# Patient Record
Sex: MALE | Race: WHITE | Employment: OTHER | URBAN - METROPOLITAN AREA
[De-identification: names, ages, dates, MRNs, and addresses within clinical notes are randomized per-mention and may not be internally consistent; named-entity substitution may affect disease eponyms.]

---

## 2017-01-04 ENCOUNTER — APPOINTMENT (OUTPATIENT)
Dept: CT IMAGING | Age: 62
End: 2017-01-04
Payer: COMMERCIAL

## 2017-01-04 ENCOUNTER — HOSPITAL ENCOUNTER (EMERGENCY)
Age: 62
Discharge: HOME | End: 2017-01-04
Attending: EMERGENCY MEDICINE
Payer: COMMERCIAL

## 2017-01-04 VITALS
HEART RATE: 78 BPM | OXYGEN SATURATION: 98 % | SYSTOLIC BLOOD PRESSURE: 144 MMHG | DIASTOLIC BLOOD PRESSURE: 81 MMHG | HEIGHT: 78 IN | RESPIRATION RATE: 18 BRPM | WEIGHT: 315 LBS | BODY MASS INDEX: 36.45 KG/M2

## 2017-01-04 DIAGNOSIS — N28.9 RENAL INSUFFICIENCY: Primary | ICD-10-CM

## 2017-01-04 DIAGNOSIS — J01.10 ACUTE FRONTAL SINUSITIS, RECURRENCE NOT SPECIFIED: ICD-10-CM

## 2017-01-04 LAB
ALBUMIN SERPL-MCNC: 3.9 G/DL (ref 3.9–4.9)
ALP BLD-CCNC: 52 U/L (ref 35–104)
ALT SERPL-CCNC: 69 U/L (ref 0–41)
ANION GAP SERPL CALCULATED.3IONS-SCNC: 16 MEQ/L (ref 7–13)
AST SERPL-CCNC: 56 U/L (ref 0–40)
BACTERIA: NORMAL /HPF
BASOPHILS ABSOLUTE: 0 K/UL (ref 0–0.2)
BASOPHILS RELATIVE PERCENT: 0.1 %
BILIRUB SERPL-MCNC: 0.7 MG/DL (ref 0–1.2)
BILIRUBIN URINE: NEGATIVE
BLOOD, URINE: ABNORMAL
BUN BLDV-MCNC: 29 MG/DL (ref 8–23)
CALCIUM SERPL-MCNC: 9.3 MG/DL (ref 8.6–10.2)
CHLORIDE BLD-SCNC: 93 MEQ/L (ref 98–107)
CLARITY: CLEAR
CO2: 24 MEQ/L (ref 22–29)
COLOR: YELLOW
CREAT SERPL-MCNC: 2.37 MG/DL (ref 0.7–1.2)
EOSINOPHILS ABSOLUTE: 0.4 K/UL (ref 0–0.7)
EOSINOPHILS RELATIVE PERCENT: 8.5 %
EPITHELIAL CELLS, UA: NORMAL /HPF
GFR AFRICAN AMERICAN: 33.9
GFR NON-AFRICAN AMERICAN: 28
GLOBULIN: 3.7 G/DL (ref 2.3–3.5)
GLUCOSE BLD-MCNC: 218 MG/DL (ref 74–109)
GLUCOSE URINE: 100 MG/DL
HCT VFR BLD CALC: 41.5 % (ref 42–52)
HEMOGLOBIN: 13.8 G/DL (ref 14–18)
INR BLD: 1.1
KETONES, URINE: NEGATIVE MG/DL
LACTIC ACID: 2.6 MMOL/L (ref 0.5–2.2)
LEUKOCYTE ESTERASE, URINE: NEGATIVE
LYMPHOCYTES ABSOLUTE: 0.5 K/UL (ref 1–4.8)
LYMPHOCYTES RELATIVE PERCENT: 10.2 %
MCH RBC QN AUTO: 30.5 PG (ref 27–31.3)
MCHC RBC AUTO-ENTMCNC: 33.2 % (ref 33–37)
MCV RBC AUTO: 91.9 FL (ref 80–100)
MONOCYTES ABSOLUTE: 0.3 K/UL (ref 0.2–0.8)
MONOCYTES RELATIVE PERCENT: 5.9 %
NEUTROPHILS ABSOLUTE: 3.5 K/UL (ref 1.4–6.5)
NEUTROPHILS RELATIVE PERCENT: 75.3 %
NITRITE, URINE: NEGATIVE
PDW BLD-RTO: 14.2 % (ref 11.5–14.5)
PH UA: 7 (ref 5–9)
PLATELET # BLD: 111 K/UL (ref 130–400)
POTASSIUM SERPL-SCNC: 4.4 MEQ/L (ref 3.5–5.1)
PROTEIN UA: 30 MG/DL
PROTHROMBIN TIME: 11.1 SEC (ref 9.6–12.3)
RAPID INFLUENZA  B AGN: NEGATIVE
RAPID INFLUENZA A AGN: NEGATIVE
RBC # BLD: 4.52 M/UL (ref 4.7–6.1)
RBC UA: NORMAL /HPF (ref 0–2)
SODIUM BLD-SCNC: 133 MEQ/L (ref 132–144)
SPECIFIC GRAVITY UA: 1.01 (ref 1–1.03)
TOTAL PROTEIN: 7.6 G/DL (ref 6.4–8.1)
URIC ACID, SERUM: 7.1 MG/DL (ref 3.4–7)
URINE REFLEX TO CULTURE: YES
UROBILINOGEN, URINE: 1 E.U./DL
WBC # BLD: 4.7 K/UL (ref 4.8–10.8)
WBC UA: NORMAL /HPF (ref 0–5)

## 2017-01-04 PROCEDURE — 83605 ASSAY OF LACTIC ACID: CPT

## 2017-01-04 PROCEDURE — 85025 COMPLETE CBC W/AUTO DIFF WBC: CPT

## 2017-01-04 PROCEDURE — 6370000000 HC RX 637 (ALT 250 FOR IP): Performed by: EMERGENCY MEDICINE

## 2017-01-04 PROCEDURE — 99284 EMERGENCY DEPT VISIT MOD MDM: CPT

## 2017-01-04 PROCEDURE — 2580000003 HC RX 258: Performed by: EMERGENCY MEDICINE

## 2017-01-04 PROCEDURE — 85610 PROTHROMBIN TIME: CPT

## 2017-01-04 PROCEDURE — 80053 COMPREHEN METABOLIC PANEL: CPT

## 2017-01-04 PROCEDURE — 81001 URINALYSIS AUTO W/SCOPE: CPT

## 2017-01-04 PROCEDURE — 74176 CT ABD & PELVIS W/O CONTRAST: CPT

## 2017-01-04 PROCEDURE — 87086 URINE CULTURE/COLONY COUNT: CPT

## 2017-01-04 PROCEDURE — 84550 ASSAY OF BLOOD/URIC ACID: CPT

## 2017-01-04 PROCEDURE — 86403 PARTICLE AGGLUT ANTBDY SCRN: CPT

## 2017-01-04 RX ORDER — AMOXICILLIN AND CLAVULANATE POTASSIUM 875; 125 MG/1; MG/1
1 TABLET, FILM COATED ORAL 2 TIMES DAILY
Qty: 20 TABLET | Refills: 0 | Status: SHIPPED | OUTPATIENT
Start: 2017-01-04 | End: 2017-01-14

## 2017-01-04 RX ORDER — AMOXICILLIN AND CLAVULANATE POTASSIUM 875; 125 MG/1; MG/1
1 TABLET, FILM COATED ORAL ONCE
Status: COMPLETED | OUTPATIENT
Start: 2017-01-04 | End: 2017-01-04

## 2017-01-04 RX ORDER — 0.9 % SODIUM CHLORIDE 0.9 %
1000 INTRAVENOUS SOLUTION INTRAVENOUS ONCE
Status: COMPLETED | OUTPATIENT
Start: 2017-01-04 | End: 2017-01-04

## 2017-01-04 RX ADMIN — AMOXICILLIN AND CLAVULANATE POTASSIUM 1 TABLET: 875; 125 TABLET, FILM COATED ORAL at 21:23

## 2017-01-04 RX ADMIN — SODIUM CHLORIDE 1000 ML: 9 INJECTION, SOLUTION INTRAVENOUS at 19:13

## 2017-01-04 ASSESSMENT — ENCOUNTER SYMPTOMS
FACIAL SWELLING: 0
BACK PAIN: 0
EYE DISCHARGE: 0
CHEST TIGHTNESS: 0
COUGH: 0
BLOOD IN STOOL: 0
STRIDOR: 0
VOMITING: 0
ABDOMINAL PAIN: 0
SHORTNESS OF BREATH: 0
CHOKING: 0
SINUS PRESSURE: 1
EYE PAIN: 0
CONSTIPATION: 0
TROUBLE SWALLOWING: 0
DIARRHEA: 0
SORE THROAT: 0
RHINORRHEA: 1
WHEEZING: 0
VOICE CHANGE: 0
EYE REDNESS: 0

## 2017-01-07 LAB — URINE CULTURE, ROUTINE: NORMAL

## 2017-01-11 ENCOUNTER — HOSPITAL ENCOUNTER (OUTPATIENT)
Dept: LAB | Age: 62
Discharge: HOME | End: 2017-01-11
Payer: COMMERCIAL

## 2017-01-11 LAB
ANION GAP SERPL CALCULATED.3IONS-SCNC: 13 MEQ/L (ref 7–13)
BILIRUBIN URINE: NEGATIVE
BLOOD, URINE: NEGATIVE
BUN BLDV-MCNC: 24 MG/DL (ref 8–23)
CALCIUM SERPL-MCNC: 9 MG/DL (ref 8.6–10.2)
CHLORIDE BLD-SCNC: 99 MEQ/L (ref 98–107)
CHOLESTEROL, TOTAL: 196 MG/DL (ref 0–199)
CLARITY: CLEAR
CO2: 21 MEQ/L (ref 22–29)
COLOR: YELLOW
CREAT SERPL-MCNC: 1.6 MG/DL (ref 0.7–1.2)
CREATININE URINE: 204 MG/DL
FOLATE: 16.9 NG/ML (ref 7.3–26.1)
GFR AFRICAN AMERICAN: 53.3
GFR NON-AFRICAN AMERICAN: 44
GLUCOSE BLD-MCNC: 166 MG/DL (ref 74–109)
GLUCOSE URINE: NEGATIVE MG/DL
HBA1C MFR BLD: 7.1 % (ref 4.8–5.9)
HCT VFR BLD CALC: 44.2 % (ref 42–52)
HDLC SERPL-MCNC: 29 MG/DL (ref 40–59)
HEMOGLOBIN: 14.6 G/DL (ref 14–18)
IRON SATURATION: 43 % (ref 11–46)
IRON: 158 UG/DL (ref 59–158)
KETONES, URINE: NEGATIVE MG/DL
LDL CHOLESTEROL CALCULATED: 143 MG/DL (ref 0–129)
LEUKOCYTE ESTERASE, URINE: NEGATIVE
MCH RBC QN AUTO: 30.8 PG (ref 27–31.3)
MCHC RBC AUTO-ENTMCNC: 33 % (ref 33–37)
MCV RBC AUTO: 93.6 FL (ref 80–100)
MICROALBUMIN UR-MCNC: 2.4 MG/DL
MICROALBUMIN/CREAT UR-RTO: 11.8 MG/G (ref 0–30)
NITRITE, URINE: NEGATIVE
PDW BLD-RTO: 13.9 % (ref 11.5–14.5)
PH UA: 5.5 (ref 5–9)
PLATELET # BLD: 148 K/UL (ref 130–400)
POTASSIUM SERPL-SCNC: 4.2 MEQ/L (ref 3.5–5.1)
PROTEIN UA: NEGATIVE MG/DL
RBC # BLD: 4.72 M/UL (ref 4.7–6.1)
SODIUM BLD-SCNC: 133 MEQ/L (ref 132–144)
SPECIFIC GRAVITY UA: 1.02 (ref 1–1.03)
TOTAL IRON BINDING CAPACITY: 364 UG/DL (ref 178–450)
TRIGL SERPL-MCNC: 122 MG/DL (ref 0–200)
UROBILINOGEN, URINE: 0.2 E.U./DL
VITAMIN B-12: 904 PG/ML (ref 211–946)
WBC # BLD: 5.6 K/UL (ref 4.8–10.8)

## 2017-01-11 PROCEDURE — 80048 BASIC METABOLIC PNL TOTAL CA: CPT

## 2017-01-11 PROCEDURE — 83550 IRON BINDING TEST: CPT

## 2017-01-11 PROCEDURE — 82607 VITAMIN B-12: CPT

## 2017-01-11 PROCEDURE — 82746 ASSAY OF FOLIC ACID SERUM: CPT

## 2017-01-11 PROCEDURE — 83540 ASSAY OF IRON: CPT

## 2017-01-11 PROCEDURE — 82043 UR ALBUMIN QUANTITATIVE: CPT

## 2017-01-11 PROCEDURE — 85027 COMPLETE CBC AUTOMATED: CPT

## 2017-01-11 PROCEDURE — 36415 COLL VENOUS BLD VENIPUNCTURE: CPT

## 2017-01-11 PROCEDURE — 81003 URINALYSIS AUTO W/O SCOPE: CPT

## 2017-01-11 PROCEDURE — 82570 ASSAY OF URINE CREATININE: CPT

## 2017-01-11 PROCEDURE — 80061 LIPID PANEL: CPT

## 2017-01-11 PROCEDURE — 83036 HEMOGLOBIN GLYCOSYLATED A1C: CPT

## 2022-01-26 ENCOUNTER — HOSPITAL ENCOUNTER (OUTPATIENT)
Dept: LAB | Age: 67
Discharge: HOME | End: 2022-01-26
Payer: MEDICARE

## 2022-01-26 LAB
ALBUMIN SERPL-MCNC: 3.8 G/DL (ref 3.5–4.6)
ALP BLD-CCNC: 82 U/L (ref 35–104)
ALT SERPL-CCNC: 89 U/L (ref 0–41)
ANION GAP SERPL CALCULATED.3IONS-SCNC: 14 MEQ/L (ref 9–15)
AST SERPL-CCNC: 63 U/L (ref 0–40)
BILIRUB SERPL-MCNC: 0.7 MG/DL (ref 0.2–0.7)
BUN BLDV-MCNC: 13 MG/DL (ref 8–23)
CALCIUM SERPL-MCNC: 9.4 MG/DL (ref 8.5–9.9)
CHLORIDE BLD-SCNC: 99 MEQ/L (ref 95–107)
CHOLESTEROL, TOTAL: 157 MG/DL (ref 0–199)
CO2: 26 MEQ/L (ref 20–31)
CREAT SERPL-MCNC: 1.13 MG/DL (ref 0.7–1.2)
GFR AFRICAN AMERICAN: >60
GFR NON-AFRICAN AMERICAN: >60
GLOBULIN: 3.3 G/DL (ref 2.3–3.5)
GLUCOSE BLD-MCNC: 262 MG/DL (ref 70–99)
HDLC SERPL-MCNC: 31 MG/DL (ref 40–59)
LDL CHOLESTEROL CALCULATED: 103 MG/DL (ref 0–129)
POTASSIUM SERPL-SCNC: 3.9 MEQ/L (ref 3.4–4.9)
SODIUM BLD-SCNC: 139 MEQ/L (ref 135–144)
TOTAL PROTEIN: 7.1 G/DL (ref 6.3–8)
TRIGL SERPL-MCNC: 117 MG/DL (ref 0–150)
URIC ACID, SERUM: 4.3 MG/DL (ref 3.4–7)

## 2022-01-26 PROCEDURE — 36415 COLL VENOUS BLD VENIPUNCTURE: CPT

## 2022-01-26 PROCEDURE — 80053 COMPREHEN METABOLIC PANEL: CPT

## 2022-01-26 PROCEDURE — 80061 LIPID PANEL: CPT

## 2022-01-26 PROCEDURE — 84550 ASSAY OF BLOOD/URIC ACID: CPT

## 2022-12-06 ENCOUNTER — HOSPITAL ENCOUNTER (OUTPATIENT)
Dept: GENERAL RADIOLOGY | Age: 67
Discharge: HOME | End: 2022-12-08
Payer: MEDICARE

## 2022-12-06 ENCOUNTER — HOSPITAL ENCOUNTER (OUTPATIENT)
Age: 67
Discharge: HOME | End: 2022-12-08
Payer: MEDICARE

## 2022-12-06 DIAGNOSIS — M54.17 LEFT LUMBOSACRAL RADICULOPATHY: ICD-10-CM

## 2022-12-06 PROCEDURE — 72110 X-RAY EXAM L-2 SPINE 4/>VWS: CPT

## 2023-01-24 ENCOUNTER — HOSPITAL ENCOUNTER (OUTPATIENT)
Dept: PHYSICAL THERAPY | Age: 68
Setting detail: THERAPIES SERIES
Discharge: HOME | End: 2023-01-24
Payer: MEDICARE

## 2023-01-24 PROCEDURE — 97162 PT EVAL MOD COMPLEX 30 MIN: CPT

## 2023-01-24 PROCEDURE — 97110 THERAPEUTIC EXERCISES: CPT

## 2023-01-24 ASSESSMENT — PAIN DESCRIPTION - ONSET: ONSET: ON-GOING

## 2023-01-24 ASSESSMENT — PAIN DESCRIPTION - DESCRIPTORS: DESCRIPTORS: BURNING;ACHING;TINGLING

## 2023-01-24 ASSESSMENT — PAIN DESCRIPTION - FREQUENCY: FREQUENCY: INTERMITTENT

## 2023-01-24 ASSESSMENT — PAIN DESCRIPTION - ORIENTATION: ORIENTATION: RIGHT;LEFT;POSTERIOR

## 2023-01-24 ASSESSMENT — PAIN SCALES - GENERAL: PAINLEVEL_OUTOF10: 0

## 2023-01-24 ASSESSMENT — PAIN - FUNCTIONAL ASSESSMENT: PAIN_FUNCTIONAL_ASSESSMENT: PREVENTS OR INTERFERES SOME ACTIVE ACTIVITIES AND ADLS

## 2023-01-24 ASSESSMENT — PAIN DESCRIPTION - PAIN TYPE: TYPE: ACUTE PAIN;CHRONIC PAIN

## 2023-01-24 NOTE — PROGRESS NOTES
[address]  PHYSICAL THERAPY PLAN OF CARE   [address] [place], [address] [facility]         Ph: [phone]  Fax: [phone]    [x] Certification  [] Recertification [x]  Plan of Care  [] Progress Note [] Discharge      Referring Provider: Inés Neves MD     From:  Sriram Hobbs, PT, DPT  Patient: Cara Gillette (40 y.o. male) : 1955 Date: 2023  Medical Diagnosis: Lumbosacral radiculitis [M54.17] Lumbosacral radiculitis Diagnosis: Lumbosacral radiculitis   Treatment Diagnosis: increased pain, decreased pain free lumbar AROM, decreased bilateral LE & core & lumbar strength, decreased ability to perform functional mobility tasks/ADLs without pain, decreased functional endurance, & impaired postural awareness    Plan of Care/Certification Expiration Date: 23   Progress Report Period from:  2023  to 2023    Visits to Date: 1 No Show: 0 Cancelled Appts: 0    OBJECTIVE:   Short Term Goals - Time Frame for Short Term Goals: 2-4 weeks    Goals Current/Discharge status  Status   Short Term Goal 1: The patient will have a decrease in Oswestry score >/=6 points in order to increase functional activity tolerance  STG 1 Current Status[de-identified] 23: Oswestry = 11/50   New   Short Term Goal 2: The patient will demonstrate improved postural awareness requiring <25% verbal cueing during functional mobility tasks & exercises  STG 2 Current Status[de-identified] 23: decreased postural awareness; forward head & rounded shoulders as well as slightly forward flexed trunk   New     Long Term Goals - Time Frame for Long Term Goals : 4-6 weeks  Goals Current/ Discharge status Status   Long Term Goal 1: The patient will demonstrate competency with HEP to progress towards self management of symptoms upon D/C LTG 1 Current Status[de-identified] 2423: on-going, initiated this date   New   Long Term Goal 2: The patient will demonstrate improved B LE & core & lumbar extension strength >/=4+/5 & Fair/Fair+ in order to perform functional mobility tasks with increased ease. LTG 2 Current Status[de-identified] 1/24/23: RLE & LLE hip flexion = 4+/5; hip extension = 4/5; hip abduction = 4/5; hip adduction = 4/5; knee flexion = 4+/5; knee extension = 5/5; SLR = 4+/5; core = Fair-; lumbar extension = Poor+/Fair-   New   Long Term Goal 3: The patient will demonstrate improved pain free lumbar AROM >/= 10-15* in order to enable patient to perform functional mobility tasks & ADLs with increased ease. LTG 3 Current Status[de-identified] 1/24/23: flexion = 100%; extension = 100%; left side bend = 50-75% (more pain); right side bend = 50-75% (pain); left rotation = %; right rotation = %   New   Long Term Goal 4: The patient will ambulate >30 minutes independently with no device consistently in order to safely ambulate in the community & resume normal walking LTG 4 Current Status[de-identified] 1/24/23: <5-10 minutes consistently; independently with no device   New     Body Structures, Functions, Activity Limitations Requiring Skilled Therapeutic Intervention: Decreased functional mobility , Decreased ADL status, Decreased ROM, Decreased strength, Decreased endurance, Increased pain, Decreased posture  Assessment: Patient is a 79year old male who presents with low back pain that intermittently radiates down bilateral LEs that began at least since August 2022. Patient demonstrates increased pain, decreased pain free lumbar AROM, decreased bilateral LE & core & lumbar strength, decreased ability to perform functional mobility tasks/ADLs without pain, decreased functional endurance, & impaired postural awareness. Patient would benefit from outpatient PT services in order to address these impairments as well as improve patient's QOL & ease with ADLs. Therapy Prognosis: Good    PT Education: Goals;PT Role;Plan of Care;Evaluative findings; Insurance;Home Exercise Program    PLAN: [x] Evaluate and Treat  Frequency/Duration:  Plan Frequency: 1-2xs/wk  Plan weeks: 4-6 weeks  Current Treatment Recommendations: Strengthening, ROM, Functional mobility training, Neuromuscular re-education, Manual, Pain management, Endurance training, Positioning, Modalities, Patient/Caregiver education & training, Equipment evaluation, education, & procurement, Home exercise program, Therapeutic activities, Safety education & training  Modalities: Heat/Cold, E-stim - unattended  Additional Comments: denies any active CA, pacemaker, seizures     Precautions:       hx of CA                     Patient Status:[x] Continue/ Initiate plan of Care     [] Discharge PT. Recommend pt continue with HEP. [] Additional visits requested, Please re-certify for additional visits:     [] Hold        Signature: Electronically signed by Mary Rivera PT on 1/24/23 at 4:19 PM EST      If you have any questions or concerns, please don't hesitate to call. Thank you for your referral.    I have reviewed this plan of care and certify a need for medically necessary rehabilitation services.     Physician Signature:__________________________________________________________  Date:  Please sign and return

## 2023-01-24 NOTE — PROGRESS NOTES
[address] [facility]  PHYSICAL THERAPY EVALUATION      Physical Therapy: Initial Evaluation    Patient: Karoline Roa (20 y.o.     male)   Examination Date: 2023   :  1955 ;    ConfirmedWilhemenia Courser MRN: [id number]  CSN: [id number]   Insurance: Payor: MEDICARE / Plan: MEDICARE PART A AND B / Product Type: *No Product type* /   Insurance ID: [id number] - (Medicare) Secondary Insurance (if applicable):  [employer]   Referring Physician: Urban Beth MD       Visits to Date/Visits Approved:  (BMN)    No Show/Cancelled Appts: 0      Medical Diagnosis: Lumbosacral radiculitis [M54.17] Lumbosacral radiculitis  Diagnosis: Lumbosacral radiculitis   Treatment Diagnosis: increased pain, decreased pain free lumbar AROM, decreased bilateral LE & core & lumbar strength, decreased ability to perform functional mobility tasks/ADLs without pain, decreased functional endurance, & impaired postural awareness     [facility]   Patient Assessed for Rehabilitation Services: Yes  Self reported health status[de-identified] Good    Medical History: Chart Reviewed: Yes   Past Medical History:   Diagnosis Date    Diabetes mellitus ([facility] [address]     Gout     Hyperlipidemia     Hypertension      Surgical History:   Past Surgical History:   Procedure Laterality Date    TONSILLECTOMY      TOTAL NEPHRECTOMY Left        Medications:   Current Outpatient Medications:     lisinopril (PRINIVIL;ZESTRIL) 20 MG tablet, Take 20 mg by mouth daily, Disp: , Rfl:     allopurinol (ZYLOPRIM) 300 MG tablet, Take 300 mg by mouth daily, Disp: , Rfl:     metFORMIN (GLUCOPHAGE) 1000 MG tablet, Take 1,000 mg by mouth 2 times daily (with meals), Disp: , Rfl:     naproxen (NAPROSYN) 500 MG tablet, Take 500 mg by mouth 2 times daily (with meals), Disp: , Rfl:     pravastatin (PRAVACHOL) 40 MG tablet, Take 40 mg by mouth daily, Disp: , Rfl:   Allergies: Morphine      Imaging (if applicable): No results found.  SUBJECTIVE EXAMINATION     History obtained from[de-identified] Patient, Chart Review,      Family/Caregiver Present: No    Subjective History: Onset Date:  (~August 2022)  Subjective: Patient reports falling at work years prior. Patient denies any falls recently. Patient reports numbness intermittently down bilateral LEs. Patient denies any prior back/hip surgeries. Patient reports standing is not challenging. Patient reports difficulty with ambulating ~5-10 minutes. Patient denies uses AD, owns crutches. Patient denies any restrictions from MD.  Patient reports difficulty with lifting & carrying most recently. Patient reports independence ADLs/IADLs/driving. Patient reports no difficulty with stairs. Patient denies any surgeries/injuries/injections. Patient denies medication or ice/heat or lidocaine patches. Patient prefers sitting versus standing.   Additional Pertinent Hx (if applicable): high blood pressure, diabetes, cancer remission (Dec. 1990), left nepherectomy 1990   Prior diagnostic testing[de-identified] X-ray (Moderate to severe degenerative changes without fracture or malalignment.)  Comments: RTD = 2/7/23; PLOF = 75%; CLOF = 50%      Learning/Language: Learning  Does the patient/guardian have any barriers to learning?: No barriers  Will there be a co-learner?: No  What is the preferred language of the patient/guardian?: English  Is an  required?: No  How does the patient/guardian prefer to learn new concepts?: Listening, Reading, Demonstration     Pain Screening    Pain Screening  Patient Currently in Pain: Yes  Pain Assessment: 0-10  Pain Level: 0  Best Pain Level: 0  Worst Pain Level: 2  Pain Type: Acute pain, Chronic pain  Pain Orientation: Right, Left, Posterior  Pain Radiating Towards: N/A  Pain Descriptors: Burning, Aching, Tingling  Pain Frequency: Intermittent  Pain Onset: On-going  Functional Pain Assessment: Prevents or interferes some active activities and ADLs  Aggravating factors: Walking  Pain Management/Relieving Factors: Rest    Functional Status    Social History:    Social History  Lives With: Alone  Type of Home: House  Home Layout: One level, Laundry in basement, Work area in basement  Home Access: Stairs to enter with rails  Entrance Stairs - Rails: Left  Entrance Stairs - Number of Steps: 3    Occupation/Interests:   Occupation: Retired    Prior Level of Function:     Independent        Current Level of Function:   Modified Independent      ADL Assistance: Independent  Homemaking Assistance: Independent  Ambulation Assistance: Independent  Transfer Assistance: Independent  Active : Yes  Mode of Transportation: Car    Dominant Hand: : Right    OBJECTIVE EXAMINATION     Restrictions:         WB Status: WBAT bilateral LEs    Review of Systems:  Vision: Impaired  Visual Deficits: Wears contacts  Hearing: Exceptions to [employer]  Hearing Exceptions: Hard of hearing/hearing concerns  Overall Orientation Status: Within Normal Limits  Patient affect[de-identified] Normal  Follows Commands: Within Functional Limits    Observations:   General Observations  General Observations: Yes  Description: bilateral tight quads; slightly tight hamstrings bilaterally; no tenderness to palpation of bilateral paraspinals/QLs/gluteals; no tenderness to palpation of lumbar vertebrae; slightly forward flexed trunk; slightly forward head & rounded shoulders    Mobility:   Ambulation  WB Status: WBAT bilateral LEs  Ambulation  Surface: Carpet  Device: No Device  Assistance: Modified Independent  Quality of Gait: slightly forward flexed posture  Gait Deviations: Slow Clair, Increased HATTIE, Decreased step length, Decreased step height  Distance: short clinic distances  Stairs/Curb  Stairs?: Yes  Stairs  # Steps : 4  Stairs Height: 8\"  Rails: Left ascending  Assistance: Modified independent   Comment: reciprocal stair pattern     Left AROM  Right AROM         AROM LLE (degrees)  LLE AROM : WFL    AROM RLE (degrees)  RLE AROM: WFL        Left PROM  Right PROM         PROM LLE (degrees)  LLE PROM: WFL    PROM RLE (degrees)  RLE PROM: WFL        Left Strength  Right Strength      Strength Other  Other: core = Fair-; lumbar extension = Poor+/Fair-  Other: core = Fair-; lumbar extension = Poor+/Fair-  Strength LLE  Comment: hip flexion = 4+/5; hip extension = 4/5; hip abduction = 4/5; hip adduction = 4/5; knee flexion = 4+/5; knee extension = 5/5; SLR = 4+/5      Strength Other  Other: core = Fair-; lumbar extension = Poor+/Fair-  Other: core = Fair-; lumbar extension = Poor+/Fair-  Strength RLE  Comment: hip flexion = 4+/5; hip extension = 4/5; hip abduction = 4/5; hip adduction = 4/5; knee flexion = 4+/5; knee extension = 5/5; SLR = 4+/5         Other: core = Fair-; lumbar extension = Poor+/Fair-         Lumbar Assessment        Lumbar: flexion = 100%; extension = 100%; left side bend = 50-75% (more pain); right side bend = 50-75% (pain); left rotation = %; right rotation = %     Outcomes Score:  Exam: Oswestry = 11/50    Treatment:    Exercises:   Exercises  Exercise 1: TA iso*  Exercise 2: bridges*  Exercise 3: SLR*  Exercise 4: LTR*  Exercise 5: SKTC with towel, 1 set x 3 reps x 20-30 second hold bilaterally, supine  Exercise 6: DKTC*  Exercise 7: supine quad stretch, with strap, 1 set x 3 reps x 20-30 seconds bilaterally  Exercise 8: supine hamstring stretch with strap, 1 set x 3 reps x 20-30 seconds bilaterally  Exercise 9: core strengthening*  Exercise 10: lumbar extension strengthening*  Exercise 20: HEP: hamstring, quad, & SKTC stretches     Modalities:  Modalities  Modalities:  (CP/HP* e-stim*)    *Indicates exercise,modality, or manual techniques to be initiated when appropriate       ASSESSMENT     Impression: Assessment: Patient is a 79year old male who presents with low back pain that intermittently radiates down bilateral LEs that began at least since August 2022.   Patient demonstrates increased pain, decreased pain free lumbar AROM, decreased bilateral LE & core & lumbar strength, decreased ability to perform functional mobility tasks/ADLs without pain, decreased functional endurance, & impaired postural awareness. Patient would benefit from outpatient PT services in order to address these impairments as well as improve patient's QOL & ease with ADLs. Body Structures, Functions, Activity Limitations Requiring Skilled Therapeutic Intervention: Decreased functional mobility , Decreased ADL status, Decreased ROM, Decreased strength, Decreased endurance, Increased pain, Decreased posture    Statement of Medical Necessity: Physical Therapy is both indicated and medically necessary as outlined in the POC to increase the likelihood of meeting the functionally related goals stated below. Patient's Activity Tolerance: Patient tolerated treatment well, Patient tolerated evaluation without incident      Patient's rehabilitation potential/prognosis is considered to be: Good    Factors which may impact rehabilitation potential include:       Patient Education: PT Education: Goals, PT Role, Plan of Care, Evaluative findings, Insurance, Home Exercise Program     GOALS   Patient Goal(s): Patient Goals : \"walk further\"    Short Term Goals Completed by 2-4 weeks Goal Status   STG 1The patient will have a decrease in Oswestry score >/=6 points in order to increase functional activity tolerance New   STG 2 The patient will demonstrate improved postural awareness requiring <25% verbal cueing during functional mobility tasks & exercises New     Long Term Goals Completed by 4-6 weeks Goal Status   LTG 1 The patient will demonstrate competency with HEP to progress towards self management of symptoms upon D/C New   LTG 2 The patient will demonstrate improved B LE & core & lumbar extension strength >/=4+/5 & Fair/Fair+ in order to perform functional mobility tasks with increased ease.  New   LTG 3 The patient will demonstrate improved pain free lumbar AROM >/= 10-15* in order to enable patient to perform functional mobility tasks & ADLs with increased ease. New   LTG 4 The patient will ambulate >30 minutes independently with no device consistently in order to safely ambulate in the community & resume normal walking New     TREATMENT PLAN       Requires PT Follow-Up: Yes    Treatment may include any combination of the following: Strengthening, ROM, Functional mobility training, Neuromuscular re-education, Manual, Pain management, Endurance training, Positioning, Modalities, Patient/Caregiver education & training, Equipment evaluation, education, & procurement, Home exercise program, Therapeutic activities, Safety education & training  Modalities: Heat/Cold, E-stim - unattended     Frequency / Duration:  Patient to be seen 1-2xs/wk times per week for 4-6 weeks weeks  Plan Comment: denies any active CA, pacemaker, seizures          Eval Complexity:   Decision Making: Medium Complexity  History: Personal Factors and/or Comorbidities Impacting POC: Medium  Examination of body system(s) including body structures and functions, activity limitations, and/or participation restrictions: Medium  Exam: Oswestry = 11/50  Clinical Presentation: Medium    POST-PAIN     Pain Rating (0-10 pain scale):   0/10  Location and pain description same as pre-treatment unless indicated. Action: [] NA  [] Call Physician  [x] Perform HEP  [] Meds as prescribed    Evaluation and patient rights have been reviewed and patient agrees with plan of care.   Yes  [x]  No  []   Explain:     Elfego Fall Risk Assessment  Risk Factor Scale  Score   History of Falls [x] Yes  [] No 25  0 25   Secondary Diagnosis [] Yes  [x] No 15  0    Ambulatory Aid [] Furniture  [] Crutches/cane/walker  [x] None/bedrest/wheelchair/nurse 30  15  0    IV/Heparin Lock [] Yes  [x] No 20  0    Gait/Transferring [] Impaired  [] Weak  [x] Normal/bedrest/immobile 20  10  0    Mental Status [] Forgets limitations  [x] Oriented to own ability 15  0       Total:25     Based on the Assessment score: check the appropriate box.   []  No intervention needed   Low =   Score of 0-24  [x]  Use standard prevention interventions Moderate =  Score of 24-44   [x] Discuss fall prevention strategies   [] Indicate moderate falls risk on eval  []  Use high risk prevention interventions High = Score of 45 and higher   [] Discuss fall prevention strategies   [] Provide supervision during treatment time      Minutes:  PT Individual Minutes  Time In: 1453  Time Out: 1550  Minutes: 57  Timed Code Treatment Minutes: 25 Minutes  Procedure Minutes: 32 minutes evaluation     Timed Activity Minutes Units   Ther Ex 25 2     Electronically signed by Andriy Jefferson PT on 1/24/23 at 4:15 PM EST

## 2023-01-27 ENCOUNTER — HOSPITAL ENCOUNTER (OUTPATIENT)
Dept: PHYSICAL THERAPY | Age: 68
Setting detail: THERAPIES SERIES
Discharge: HOME | End: 2023-01-27
Payer: MEDICARE

## 2023-01-27 PROCEDURE — 97110 THERAPEUTIC EXERCISES: CPT

## 2023-01-27 ASSESSMENT — PAIN SCALES - GENERAL: PAINLEVEL_OUTOF10: 1

## 2023-01-27 ASSESSMENT — PAIN DESCRIPTION - ORIENTATION: ORIENTATION: RIGHT;LEFT;POSTERIOR

## 2023-01-27 ASSESSMENT — PAIN DESCRIPTION - PAIN TYPE: TYPE: ACUTE PAIN;CHRONIC PAIN

## 2023-01-27 ASSESSMENT — PAIN DESCRIPTION - DESCRIPTORS: DESCRIPTORS: ACHING

## 2023-01-27 ASSESSMENT — PAIN DESCRIPTION - LOCATION: LOCATION: BACK;LEG

## 2023-01-27 NOTE — PROGRESS NOTES
[address] [facility]  Outpatient Physical Therapy    Treatment Note        Date: 2023  Patient: Kamla June  : 1955   Confirmed: Yes  MRN: [id number]  Referring Provider: Yobany Guevara MD    Medical Diagnosis: Lumbosacral radiculitis [M54.17]       Treatment Diagnosis: increased pain, decreased pain free lumbar AROM, decreased bilateral LE & core & lumbar strength, decreased ability to perform functional mobility tasks/ADLs without pain, decreased functional endurance, & impaired postural awareness    Visit Information:  Insurance: Payor: Justin Simmons / Plan: MEDICARE PART A AND B / Product Type: *No Product type* /   PT Visit Information  Onset Date:  (~2022)  PT Insurance Information: Medicare  Total # of Visits Approved: 99 (BMN)  Total # of Visits to Date: 2  Plan of Care/Certification Expiration Date: 23  No Show: 0  Progress Note Due Date: 23  Canceled Appointment: 0  Progress Note Counter: - (30 day PN due 23)    Subjective Information:  Subjective: Patient reports \"it is a miracle. \"  Patient presents with \"a half\" of pain today.   HEP Compliance:  [x] Good [] Fair [] Poor [] Reports not doing due to:    Pain Screening  Patient Currently in Pain: Yes  Pain Assessment: 0-10  Pain Level: 1  Pain Type: Acute pain, Chronic pain  Pain Location: Back, Leg  Pain Orientation: Right, Left, Posterior  Pain Descriptors: Aching    Treatment:  Exercises:  Exercises  Exercise 2: TA isometrics with bridges, 1 set x 15 reps x 5 second hold, hook-lying  Exercise 3: SLR with quad set, 1 set x 15 reps x 5 second hold, bilaterally, hook-lying  Exercise 4: LTR, hook-lying, 1 set x 10 rep x 5 seconds each direction  Exercise 6: DKTC*  Exercise 9: core strengthening*  Exercise 10: lumbar extension strengthening*  Exercise 11: paloff press with bilateral YTB, 1 sets x 15 reps x 5 second hold each direction  Exercise 12: rows & triceps extension/lat pull downs, 1 set x 15 reps x 5 second hold, RTB  Exercise 20: HEP: continue with current + SLR & bridges & paloff press & rows & triceps extension/lat pull downs    *Indicates exercise, modality, or manual techniques to be initiated when appropriate    Objective Measures:     STG 1 Current Status[de-identified] 1/24/23: Oswestry = 11/50  STG 2 Current Status[de-identified] 1/24/23: decreased postural awareness; forward head & rounded shoulders as well as slightly forward flexed trunk           LTG 1 Current Status[de-identified] 1/2423: on-going, initiated this date  LTG 2 Current Status[de-identified] 1/24/23: RLE & LLE hip flexion = 4+/5; hip extension = 4/5; hip abduction = 4/5; hip adduction = 4/5; knee flexion = 4+/5; knee extension = 5/5; SLR = 4+/5; core = Fair-; lumbar extension = Poor+/Fair-  LTG 3 Current Status[de-identified] 1/24/23: flexion = 100%; extension = 100%; left side bend = 50-75% (more pain); right side bend = 50-75% (pain); left rotation = %; right rotation = %  LTG 4 Current Status[de-identified] 1/24/23: <5-10 minutes consistently; independently with no device      Assessment: Body Structures, Functions, Activity Limitations Requiring Skilled Therapeutic Intervention: Decreased functional mobility , Decreased ADL status, Decreased ROM, Decreased strength, Decreased endurance, Increased pain, Decreased posture  Assessment: Initiated patient's first full outpatient PT session this date with continued progression of exercises/stretches to patient's tolerance. Difficulty with exercises secondary to weakness not pain. No pain reported post-treatment session. Treatment Diagnosis: increased pain, decreased pain free lumbar AROM, decreased bilateral LE & core & lumbar strength, decreased ability to perform functional mobility tasks/ADLs without pain, decreased functional endurance, & impaired postural awareness  Therapy Prognosis: Good    Post-Pain Assessment:       Pain Rating (0-10 pain scale):   0/10   Location and pain description same as pre-treatment unless indicated. Action: [] NA   [x] Perform HEP  [] Meds as prescribed  [x] Modalities as prescribed   [] Call Physician     GOALS   Patient Goal(s): Patient Goals : \"walk further\"    Short Term Goals Completed by 2-4 weeks Goal Status   STG 1 The patient will have a decrease in Oswestry score >/=6 points in order to increase functional activity tolerance In progress   STG 2 The patient will demonstrate improved postural awareness requiring <25% verbal cueing during functional mobility tasks & exercises In progress     Long Term Goals Completed by 4-6 weeks Goal Status   LTG 1 The patient will demonstrate competency with HEP to progress towards self management of symptoms upon D/C In progress   LTG 2 The patient will demonstrate improved B LE & core & lumbar extension strength >/=4+/5 & Fair/Fair+ in order to perform functional mobility tasks with increased ease. In progress   LTG 3 The patient will demonstrate improved pain free lumbar AROM >/= 10-15* in order to enable patient to perform functional mobility tasks & ADLs with increased ease. In progress   LTG 4 The patient will ambulate >30 minutes independently with no device consistently in order to safely ambulate in the community & resume normal walking In progress     Plan:  Frequency/Duration:  Plan  Plan Frequency: 1-2xs/wk  Plan weeks: 4-6 weeks  Current Treatment Recommendations: Strengthening, ROM, Functional mobility training, Neuromuscular re-education, Manual, Pain management, Endurance training, Positioning, Modalities, Patient/Caregiver education & training, Equipment evaluation, education, & procurement, Home exercise program, Therapeutic activities, Safety education & training  Modalities: Heat/Cold, E-stim - unattended  Additional Comments: denies any active CA, pacemaker, seizures  Pt to continue current HEP. See objective section for any therapeutic exercise changes, additions or modifications this date.     Therapy Time:      PT Individual Minutes  Time In: 1510  Time Out: 1553  Minutes: 43  Timed Code Treatment Minutes: 43 Minutes  Procedure Minutes: 0 minutes  Timed Activity Minutes Units   Ther Ex 43 3   Electronically signed by Serena Hillman PT on 1/27/23 at 3:55 PM EST

## 2023-01-31 ENCOUNTER — HOSPITAL ENCOUNTER (OUTPATIENT)
Dept: PHYSICAL THERAPY | Age: 68
Setting detail: THERAPIES SERIES
Discharge: HOME | End: 2023-01-31
Payer: MEDICARE

## 2023-01-31 PROCEDURE — 97140 MANUAL THERAPY 1/> REGIONS: CPT

## 2023-01-31 PROCEDURE — 97110 THERAPEUTIC EXERCISES: CPT

## 2023-01-31 ASSESSMENT — PAIN DESCRIPTION - ORIENTATION: ORIENTATION: RIGHT;LEFT;POSTERIOR

## 2023-01-31 ASSESSMENT — PAIN DESCRIPTION - DESCRIPTORS: DESCRIPTORS: ACHING

## 2023-01-31 ASSESSMENT — PAIN DESCRIPTION - FREQUENCY: FREQUENCY: INTERMITTENT

## 2023-01-31 ASSESSMENT — PAIN SCALES - GENERAL: PAINLEVEL_OUTOF10: 2

## 2023-01-31 ASSESSMENT — PAIN DESCRIPTION - ONSET: ONSET: ON-GOING

## 2023-01-31 ASSESSMENT — PAIN DESCRIPTION - PAIN TYPE: TYPE: ACUTE PAIN;CHRONIC PAIN

## 2023-01-31 ASSESSMENT — PAIN DESCRIPTION - LOCATION: LOCATION: BACK;LEG

## 2023-01-31 NOTE — PROGRESS NOTES
[address] [facility]  Outpatient Physical Therapy    Treatment Note        Date: 2023  Patient: Savi Jean  : 1955   Confirmed: Yes  MRN: [id number]  Referring Provider: Noelle Alejandro MD    Medical Diagnosis: Lumbosacral radiculitis [M54.17]       Treatment Diagnosis: increased pain, decreased pain free lumbar AROM, decreased bilateral LE & core & lumbar strength, decreased ability to perform functional mobility tasks/ADLs without pain, decreased functional endurance, & impaired postural awareness    Visit Information:  Insurance: Payor: Daniel Turner / Plan: MEDICARE PART A AND B / Product Type: *No Product type* /   PT Visit Information  Onset Date:  (~2022)  PT Insurance Information: Medicare  Total # of Visits Approved: 99 (BMN)  Total # of Visits to Date: 3  Plan of Care/Certification Expiration Date: 23  No Show: 0  Progress Note Due Date: 23  Canceled Appointment: 0  Progress Note Counter: 3/4- (30 day PN due 23)    Subjective Information:  Subjective: Patient reports \"bridges\" have been giving him the most problem.   HEP Compliance:  [x] Good [] Fair [] Poor [] Reports not doing due to:    Pain Screening  Patient Currently in Pain: Yes  Pain Assessment: 0-10  Pain Level: 2  Pain Type: Acute pain, Chronic pain  Pain Location: Back, Leg  Pain Orientation: Right, Left, Posterior  Pain Descriptors: Aching  Pain Frequency: Intermittent  Pain Onset: On-going    Treatment:  Exercises:  Exercises  Exercise 6: DKTC*  Exercise 9: core strengthening*  Exercise 11: paloff press with bilateral YTB, 1 sets x 15 reps x 5 second hold each direction  Exercise 12: rows & triceps extension/lat pull downs, 1 set x 15 reps x 5 second hold, RTB  Exercise 13: prone static stretch on elbows, 5 minutes  Exercise 14: prone press ups on elbows bent, 1 set x 15 reps x 5 second hold  Exercise 15: prone press ups on elbows straight, 1 set x 10 reps x 5 second hold  Exercise 16: cat/cow stretch, 1 set x 10 reps x 5 second hold  Exercise 17: quadriped DLS*  Exercise 18: hip 4-way, YTB, unilateral UE support, 1 set x 15 reps each motion each UE  Exercise 19: trialed ITB stretch standing & side-lying with patient not feeling stretch either leg in either position  Exercise 20: HEP: continue with current + cat/cow stretch & prone static stretch & hip 4-way       Manual:   Manual Therapy  Joint Mobilization: lumbar PA mobilizations, grades 1-2, x8 minutes total    *Indicates exercise, modality, or manual techniques to be initiated when appropriate    Objective Measures:     STG 1 Current Status[de-identified] 1/24/23: Oswestry = 11/50  STG 2 Current Status[de-identified] 1/24/23: decreased postural awareness; forward head & rounded shoulders as well as slightly forward flexed trunk           LTG 1 Current Status[de-identified] 1/2423: on-going, initiated this date  LTG 2 Current Status[de-identified] 1/24/23: RLE & LLE hip flexion = 4+/5; hip extension = 4/5; hip abduction = 4/5; hip adduction = 4/5; knee flexion = 4+/5; knee extension = 5/5; SLR = 4+/5; core = Fair-; lumbar extension = Poor+/Fair-  LTG 3 Current Status[de-identified] 1/24/23: flexion = 100%; extension = 100%; left side bend = 50-75% (more pain); right side bend = 50-75% (pain); left rotation = %; right rotation = %  LTG 4 Current Status[de-identified] 1/24/23: <5-10 minutes consistently; independently with no device             Assessment: Body Structures, Functions, Activity Limitations Requiring Skilled Therapeutic Intervention: Decreased functional mobility , Decreased ADL status, Decreased ROM, Decreased strength, Decreased endurance, Increased pain, Decreased posture  Assessment: Continued with patient's PT POC with addition of prone exercises/stretches to attempt to facilitate pain down bilateral LEs centralizing towards back with positive results reported post-treatment session. Addition of cat/cow stretch for increased trunk mobility as well as manual PA mobs.   Declined modalities post-treatment. Treatment Diagnosis: increased pain, decreased pain free lumbar AROM, decreased bilateral LE & core & lumbar strength, decreased ability to perform functional mobility tasks/ADLs without pain, decreased functional endurance, & impaired postural awareness  Therapy Prognosis: Good      Post-Pain Assessment:       Pain Rating (0-10 pain scale): 0  /10   Location and pain description same as pre-treatment unless indicated. Action: [] NA   [x] Perform HEP  [] Meds as prescribed  [x] Modalities as prescribed   [] Call Physician     GOALS   Patient Goal(s): Patient Goals : \"walk further\"    Short Term Goals Completed by 2-4 weeks Goal Status   STG 1 The patient will have a decrease in Oswestry score >/=6 points in order to increase functional activity tolerance In progress   STG 2 The patient will demonstrate improved postural awareness requiring <25% verbal cueing during functional mobility tasks & exercises In progress     Long Term Goals Completed by 4-6 weeks Goal Status   LTG 1 The patient will demonstrate competency with HEP to progress towards self management of symptoms upon D/C In progress   LTG 2 The patient will demonstrate improved B LE & core & lumbar extension strength >/=4+/5 & Fair/Fair+ in order to perform functional mobility tasks with increased ease. In progress   LTG 3 The patient will demonstrate improved pain free lumbar AROM >/= 10-15* in order to enable patient to perform functional mobility tasks & ADLs with increased ease.  In progress   LTG 4 The patient will ambulate >30 minutes independently with no device consistently in order to safely ambulate in the community & resume normal walking In progress       Plan:  Frequency/Duration:  Plan  Plan Frequency: 1-2xs/wk  Plan weeks: 4-6 weeks  Current Treatment Recommendations: Strengthening, ROM, Functional mobility training, Neuromuscular re-education, Manual, Pain management, Endurance training, Positioning, Modalities, Patient/Caregiver education & training, Equipment evaluation, education, & procurement, Home exercise program, Therapeutic activities, Safety education & training  Modalities: Heat/Cold, E-stim - unattended  Additional Comments: denies any active CA, pacemaker, seizures  Pt to continue current HEP. See objective section for any therapeutic exercise changes, additions or modifications this date.     Therapy Time:      PT Individual Minutes  Time In: 1510  Time Out: 1600  Minutes: 50  Timed Code Treatment Minutes: 50 Minutes  Procedure Minutes: 0 minutes  Timed Activity Minutes Units   Ther Ex 42 2   Manual  8 1     Electronically signed by Yvonne Douglas, PT on 1/31/23 at 4:25 PM EST

## 2023-02-02 ENCOUNTER — HOSPITAL ENCOUNTER (OUTPATIENT)
Dept: LAB | Age: 68
Discharge: HOME | End: 2023-02-02
Payer: MEDICARE

## 2023-02-02 LAB
ALBUMIN SERPL-MCNC: 4.3 G/DL (ref 3.5–4.6)
ALP BLD-CCNC: 65 U/L (ref 35–104)
ALT SERPL-CCNC: 55 U/L (ref 0–41)
ANION GAP SERPL CALCULATED.3IONS-SCNC: 10 MEQ/L (ref 9–15)
AST SERPL-CCNC: 38 U/L (ref 0–40)
BILIRUB SERPL-MCNC: 0.4 MG/DL (ref 0.2–0.7)
BUN BLDV-MCNC: 20 MG/DL (ref 8–23)
CALCIUM SERPL-MCNC: 9.4 MG/DL (ref 8.5–9.9)
CHLORIDE BLD-SCNC: 102 MEQ/L (ref 95–107)
CHOLESTEROL, TOTAL: 198 MG/DL (ref 0–199)
CO2: 26 MEQ/L (ref 20–31)
CREAT SERPL-MCNC: 1.35 MG/DL (ref 0.7–1.2)
GFR SERPL CREATININE-BSD FRML MDRD: 57.2 ML/MIN/{1.73_M2}
GLOBULIN: 2.8 G/DL (ref 2.3–3.5)
GLUCOSE BLD-MCNC: 136 MG/DL (ref 70–99)
HDLC SERPL-MCNC: 38 MG/DL (ref 40–59)
LDL CHOLESTEROL CALCULATED: 133 MG/DL (ref 0–129)
POTASSIUM SERPL-SCNC: 4.7 MEQ/L (ref 3.4–4.9)
SODIUM BLD-SCNC: 138 MEQ/L (ref 135–144)
TOTAL PROTEIN: 7.1 G/DL (ref 6.3–8)
TRIGL SERPL-MCNC: 133 MG/DL (ref 0–150)

## 2023-02-02 PROCEDURE — 80061 LIPID PANEL: CPT

## 2023-02-02 PROCEDURE — 80053 COMPREHEN METABOLIC PANEL: CPT

## 2023-02-02 PROCEDURE — 36415 COLL VENOUS BLD VENIPUNCTURE: CPT

## 2023-02-03 ENCOUNTER — HOSPITAL ENCOUNTER (OUTPATIENT)
Dept: PHYSICAL THERAPY | Age: 68
Setting detail: THERAPIES SERIES
Discharge: HOME | End: 2023-02-03
Payer: MEDICARE

## 2023-02-03 PROCEDURE — 97110 THERAPEUTIC EXERCISES: CPT

## 2023-02-03 ASSESSMENT — PAIN DESCRIPTION - DESCRIPTORS: DESCRIPTORS: NAGGING

## 2023-02-03 ASSESSMENT — PAIN SCALES - GENERAL: PAINLEVEL_OUTOF10: 1

## 2023-02-03 ASSESSMENT — PAIN DESCRIPTION - LOCATION: LOCATION: HIP

## 2023-02-03 ASSESSMENT — PAIN DESCRIPTION - ORIENTATION: ORIENTATION: RIGHT;LEFT

## 2023-02-03 NOTE — PROGRESS NOTES
[address] [facility]  Outpatient Physical Therapy    Treatment Note        Date: 2/3/2023  Patient: Savi Jean  : 1955   Confirmed: Yes  MRN: [id number]  Referring Provider: Noelle Alejandro MD    Medical Diagnosis: Lumbosacral radiculitis [M54.17]            Visit Information:  Insurance: Payor: Daniel Turner / Plan: MEDICARE PART A AND B / Product Type: *No Product type* /   PT Visit Information  PT Insurance Information: Medicare  Total # of Visits Approved: 99  Total # of Visits to Date: 4  Plan of Care/Certification Expiration Date: 23  No Show: 0  Progress Note Due Date: 23  Canceled Appointment: 0  Progress Note Counter: 3/4-12 (30 day PN due 23)    Subjective Information:  Subjective: Pt reported feeling great the last 2 days and was on his feet for 2 hours consistently. Today pt reported increased pain in bilateral hips this date. This morning the pt reported feeling lightheaded when sitting up EOB, took his own BP and noted it was WNL. Pt noted experiencing vertigo several years ago.   HEP Compliance:  [x] Good [] Fair [] Poor [] Reports not doing due to:    Pain Screening  Patient Currently in Pain: Yes  Pain Assessment: 0-10  Pain Level: 1  Pain Location: Hip  Pain Orientation: Right, Left  Pain Descriptors: Nagging    Treatment:  Exercises:  Exercises  Exercise 13: prone static stretch on elbows, 5 minutes  Exercise 14: prone press ups on elbows bent, 1 set x 15 reps x 5 second hold         *Indicates exercise, modality, or manual techniques to be initiated when appropriate    Objective Measures:       LTG 2 Current Status[de-identified] 2/3/23: RLE & LLE hip flexion = 5/5; hip extension = 4+/5; hip abduction = 5/5; hip adduction = 5/5; knee flexion = 5/5; knee extension = 5/5; SLR = 4+/5; core = Fair-; lumbar extension = Poor+/Fair-  LTG 3 Current Status[de-identified] 2/3/23 Flexion +100%, extension +100%, Left side bend = 25% (hip pain), right side bend = 50% (more pain), left rotation = 75- 100%, right rotation = 75 - 100% (hip pain)  LTG 4 Current Status[de-identified] 2/3/23 ~15 minutes consistently; independently with no device. Assessment:      Assessment: After testing bilateral LE muscle strength, pt sat EOB and reported feeling \"dizzy\". Paitents BP tested in sitting, 172/86. After a rest break until symptoms subsided, continued treatment with prone exercises. When in quadriped pt reported feeling nauseous and \"dizzy\" again. Treatment was discontinued and pt rested until symptoms subsided again before leaving. Pt declined any medical assistance. Patient Education: [x] NA       Post-Pain Assessment:       Pain Rating (0-10 pain scale):   1/10   Location and pain description same as pre-treatment unless indicated. Action: [] NA   [x] Perform HEP  [] Meds as prescribed  [] Modalities as prescribed   [] Call Physician     GOALS   Patient Goal(s): Patient Goals : \"walk further\"    Short Term Goals Completed by 2-4 weeks Goal Status   STG 1 The patient will have a decrease in Oswestry score >/=6 points in order to increase functional activity tolerance In progress   STG 2 The patient will demonstrate improved postural awareness requiring <25% verbal cueing during functional mobility tasks & exercises In progress       Long Term Goals Completed by 4-6 weeks Goal Status   LTG 1 The patient will demonstrate competency with HEP to progress towards self management of symptoms upon D/C In progress   LTG 2 The patient will demonstrate improved B LE & core & lumbar extension strength >/=4+/5 & Fair/Fair+ in order to perform functional mobility tasks with increased ease. In progress   LTG 3 The patient will demonstrate improved pain free lumbar AROM >/= 10-15* in order to enable patient to perform functional mobility tasks & ADLs with increased ease.  In progress   LTG 4 The patient will ambulate >30 minutes independently with no device consistently in order to safely ambulate in the community & resume normal walking In progress          Plan:  Frequency/Duration:  Plan  Plan Frequency: 1-2xs/wk  Plan weeks: 4-6 weeks  Current Treatment Recommendations: Strengthening, ROM, Functional mobility training, Neuromuscular re-education, Manual, Pain management, Endurance training, Positioning, Modalities, Patient/Caregiver education & training, Equipment evaluation, education, & procurement, Home exercise program, Therapeutic activities, Safety education & training  Modalities: Heat/Cold, E-stim - unattended  Additional Comments: denies any active CA, pacemaker, seizures  Pt to continue current HEP. See objective section for any therapeutic exercise changes, additions or modifications this date.     Therapy Time:      PT Individual Minutes  Time In: 0992  Time Out: 1612  Minutes: 54  Timed Code Treatment Minutes: 54 Minutes  Procedure Minutes: 0  Timed Activity Minutes Units   Ther Ex  54  4     Electronically signed by Gabi Quiroz PTA on 2/3/23 at 4:03 PM EST

## 2023-02-07 ENCOUNTER — HOSPITAL ENCOUNTER (OUTPATIENT)
Dept: PHYSICAL THERAPY | Age: 68
Setting detail: THERAPIES SERIES
Discharge: HOME | End: 2023-02-07
Payer: MEDICARE

## 2023-02-07 PROCEDURE — 97110 THERAPEUTIC EXERCISES: CPT

## 2023-02-07 NOTE — PROGRESS NOTES
[address] [facility]  Outpatient Physical Therapy    Treatment Note        Date: 2023  Patient: Melissa Billingsley  : 1955   Confirmed: Yes  MRN: [id number]  Referring Provider: Arnol Howell MD    Medical Diagnosis: Lumbosacral radiculitis [M54.17]       Treatment Diagnosis: increased pain, decreased pain free lumbar AROM, decreased bilateral LE & core & lumbar strength, decreased ability to perform functional mobility tasks/ADLs without pain, decreased functional endurance, & impaired postural awareness    Visit Information:  Insurance: Payor: Anastasiya Proctor / Plan: MEDICARE PART A AND B / Product Type: *No Product type* /   PT Visit Information  PT Insurance Information: Medicare  Total # of Visits Approved: 99  Total # of Visits to Date: 5  Plan of Care/Certification Expiration Date: 23  No Show: 0  Progress Note Due Date: 23  Canceled Appointment: 0  Progress Note Counter: - (30 day PN due 23)    Subjective Information:  Subjective: Patient reports he is feeling better since last session. He reports he had vertigo for a couple days and its alittle better.   HEP Compliance:  [x] Good [] Fair [] Poor [] Reports not doing due to:    Pain Screening  Patient Currently in Pain: Denies    Treatment:  Exercises:  Exercises  Exercise 4: LTR, hook-lying, 1 set x 10 rep x 5 seconds each direction  Exercise 10: Resisted Thoracic Rotations x 15 GTB thelma  Exercise 11: paloff press with bilateral GTB, 1 sets x 15 reps x 5 second hold each direction  Exercise 12: rows & triceps extension/lat pull downs, 1 set x 15 reps x 5 second hold, RTB  Exercise 14: prone press ups on elbows bent, 1 set x 15 reps x 5 second hold  Exercise 17: quadriped DLS ( kick backs) x 10  Exercise 18: hip 4-way, RTB, unilateral UE support, 1 set x 15 reps each motion each UE  Exercise 20: HEP: continue with current + cat/cow stretch & prone static stretch & hip 4-way     Objective Measures:        Assessment: Body Structures, Functions, Activity Limitations Requiring Skilled Therapeutic Intervention: Decreased functional mobility , Decreased ADL status, Decreased ROM, Decreased strength, Decreased endurance, Increased pain, Decreased posture  Assessment: Patient able to perform mobility and strengthening exercises for entire treatment session this date with no complaints of dizziness. Patient tolerated newly initiated core exercises that included thoracic rotations and quadraped kick backs. Patient noting no pain throughout session. Treatment Diagnosis: increased pain, decreased pain free lumbar AROM, decreased bilateral LE & core & lumbar strength, decreased ability to perform functional mobility tasks/ADLs without pain, decreased functional endurance, & impaired postural awareness  Therapy Prognosis: Good       Post-Pain Assessment:       Pain Rating (0-10 pain scale): 0  /10   Location and pain description same as pre-treatment unless indicated. Action: [x] NA   [] Perform HEP  [] Meds as prescribed  [] Modalities as prescribed   [] Call Physician     GOALS   Patient Goal(s): Patient Goals : \"walk further\"    Short Term Goals Completed by 2-4 weeks Goal Status   STG 1 The patient will have a decrease in Oswestry score >/=6 points in order to increase functional activity tolerance In progress   STG 2 The patient will demonstrate improved postural awareness requiring <25% verbal cueing during functional mobility tasks & exercises In progress     Long Term Goals Completed by 4-6 weeks Goal Status   LTG 1 The patient will demonstrate competency with HEP to progress towards self management of symptoms upon D/C In progress   LTG 2 The patient will demonstrate improved B LE & core & lumbar extension strength >/=4+/5 & Fair/Fair+ in order to perform functional mobility tasks with increased ease.  In progress   LTG 3 The patient will demonstrate improved pain free lumbar AROM >/= 10-15* in order to enable patient to perform functional mobility tasks & ADLs with increased ease. In progress   LTG 4 The patient will ambulate >30 minutes independently with no device consistently in order to safely ambulate in the community & resume normal walking In progress   Plan:  Frequency/Duration:  Plan  Plan Frequency: 1-2xs/wk  Plan weeks: 4-6 weeks  Current Treatment Recommendations: Strengthening, ROM, Functional mobility training, Neuromuscular re-education, Manual, Pain management, Endurance training, Positioning, Modalities, Patient/Caregiver education & training, Equipment evaluation, education, & procurement, Home exercise program, Therapeutic activities, Safety education & training  Modalities: Heat/Cold, E-stim - unattended  Additional Comments: denies any active CA, pacemaker, seizures  Pt to continue current HEP. See objective section for any therapeutic exercise changes, additions or modifications this date.     Therapy Time:      PT Individual Minutes  Time In: 2411  Time Out: 2970  Minutes: 46  Timed Code Treatment Minutes: 46 Minutes  Procedure Minutes: 0  Timed Activity Minutes Units   Ther Ex 46 3   Electronically signed by Иван Black PTA on 2/7/23 at 4:04 PM EST

## 2023-02-10 ENCOUNTER — HOSPITAL ENCOUNTER (OUTPATIENT)
Dept: PHYSICAL THERAPY | Age: 68
Setting detail: THERAPIES SERIES
Discharge: HOME | End: 2023-02-10
Payer: MEDICARE

## 2023-02-10 PROCEDURE — 97110 THERAPEUTIC EXERCISES: CPT

## 2023-02-10 ASSESSMENT — PAIN DESCRIPTION - DESCRIPTORS: DESCRIPTORS: ACHING

## 2023-02-10 ASSESSMENT — PAIN SCALES - GENERAL: PAINLEVEL_OUTOF10: 1

## 2023-02-10 ASSESSMENT — PAIN DESCRIPTION - PAIN TYPE: TYPE: CHRONIC PAIN

## 2023-02-10 ASSESSMENT — PAIN DESCRIPTION - LOCATION: LOCATION: BACK

## 2023-02-10 NOTE — PROGRESS NOTES
[address] [facility]  Outpatient Physical Therapy    Treatment Note        Date: 2/10/2023  Patient: Dany Copeland  : 1955   Confirmed: Yes  MRN: [id number]  Referring Provider: Kathleen Moeller MD    Medical Diagnosis: Lumbosacral radiculitis [M54.17]       Treatment Diagnosis: increased pain, decreased pain free lumbar AROM, decreased bilateral LE & core & lumbar strength, decreased ability to perform functional mobility tasks/ADLs without pain, decreased functional endurance, & impaired postural awareness    Visit Information:  Insurance: Payor: Jocarol Guzmán / Plan: MEDICARE PART A AND B / Product Type: *No Product type* /   PT Visit Information  PT Insurance Information: Medicare  Total # of Visits Approved: 99  Total # of Visits to Date: 6  Plan of Care/Certification Expiration Date: 23  No Show: 0  Progress Note Due Date: 23  Canceled Appointment: 0  Progress Note Counter: -12 (30 day PN due 23)    Subjective Information:  Subjective: Pt reports minimal back pain at this time 1/10 has been feeling better and feels that the therapy has been helping him as walking tolerance seems to be improving at this time.   HEP Compliance:  [x] Good [] Fair [] Poor [] Reports not doing due to:    Pain Screening  Patient Currently in Pain: Yes  Pain Assessment: 0-10  Pain Level: 1  Pain Type: Chronic pain  Pain Location: Back  Pain Descriptors: Aching    Treatment:  Exercises:  Exercises  Exercise 4: LTR, hook-lying, 1 set x 10 rep x 5 seconds each direction  Exercise 6: Seated T-band Lumbar Ext w/ Gray T-band x 20 with Moderate tension held by therapist.  Exercise 10: Resisted Thoracic Rotations x 15 GTB thelma  Exercise 11: paloff press with bilateral GTB, 1 sets x 15 reps x 5 second hold each direction  Exercise 12: rows & triceps extension/lat pull downs, 1 set x 15 reps x 5 second hold, GTB ( [place] trial [place])  Exercise 17: quadriped DLS ( kick backs) x 10 (pain reported posterior Right knee when into full Ext)  Exercise 18: hip 4-way, RTB, unilateral UE support, 1 set x 15 reps each motion each UE         Objective Measures:             Assessment: Body Structures, Functions, Activity Limitations Requiring Skilled Therapeutic Intervention: Decreased functional mobility , Decreased ADL status, Decreased ROM, Decreased strength, Decreased endurance, Increased pain, Decreased posture  Assessment: continue to progress pt with increased resistance bands, will trial [place] weight system with UE exercises next visit to further challenge pt's endurnace and strenthening. While staying with RTB for LE exercises as pt is currently challenge at that level. Increased knee pain and discomfort reported on Right posterior hip Ext performed in quadped with pt reporting pain and sxs started at last visit while performing same exercise. Progressed Lumbar extension exercise with addition of seated extension performed with gray T-band performed with moderate tension held by therapist, good tolerance as pt deneis any pain or complaints with new exercise. Treatment Diagnosis: increased pain, decreased pain free lumbar AROM, decreased bilateral LE & core & lumbar strength, decreased ability to perform functional mobility tasks/ADLs without pain, decreased functional endurance, & impaired postural awareness  Therapy Prognosis: Good       Patient Education: [x] NA       Post-Pain Assessment:       Pain Rating (0-10 pain scale):  0 /10   Location and pain description same as pre-treatment unless indicated.    Action: [] NA   [x] Perform HEP  [] Meds as prescribed  [] Modalities as prescribed   [] Call Physician     GOALS   Patient Goal(s): Patient Goals : \"walk further\"    Short Term Goals Completed by 2-4 weeks Goal Status   STG 1 The patient will have a decrease in Oswestry score >/=6 points in order to increase functional activity tolerance In progress   STG 2 The patient will demonstrate improved postural awareness requiring <25% verbal cueing during functional mobility tasks & exercises In progress     Long Term Goals Completed by 4-6 weeks Goal Status   LTG 1 The patient will demonstrate competency with HEP to progress towards self management of symptoms upon D/C In progress   LTG 2 The patient will demonstrate improved B LE & core & lumbar extension strength >/=4+/5 & Fair/Fair+ in order to perform functional mobility tasks with increased ease. In progress   LTG 3 The patient will demonstrate improved pain free lumbar AROM >/= 10-15* in order to enable patient to perform functional mobility tasks & ADLs with increased ease. In progress   LTG 4 The patient will ambulate >30 minutes independently with no device consistently in order to safely ambulate in the community & resume normal walking In progress       Plan:  Frequency/Duration:  Plan  Plan Frequency: 1-2xs/wk  Plan weeks: 4-6 weeks  Current Treatment Recommendations: Strengthening, ROM, Functional mobility training, Neuromuscular re-education, Manual, Pain management, Endurance training, Positioning, Modalities, Patient/Caregiver education & training, Equipment evaluation, education, & procurement, Home exercise program, Therapeutic activities, Safety education & training  Modalities: Heat/Cold, E-stim - unattended  Additional Comments: denies any active CA, pacemaker, seizures  Pt to continue current HEP. See objective section for any therapeutic exercise changes, additions or modifications this date.     Therapy Time:      PT Individual Minutes  Time In: 2396  Time Out: 7033  Minutes: 43  Timed Code Treatment Minutes: 43 Minutes  Procedure Minutes:0  Timed Activity Minutes Units   Ther Ex 43 3     Electronically signed by Isabella Gonzales PTA on 2/10/23 at 3:52 PM EST

## 2023-02-14 ENCOUNTER — HOSPITAL ENCOUNTER (OUTPATIENT)
Dept: PHYSICAL THERAPY | Age: 68
Setting detail: THERAPIES SERIES
Discharge: HOME | End: 2023-02-14
Payer: MEDICARE

## 2023-02-14 PROCEDURE — 97110 THERAPEUTIC EXERCISES: CPT

## 2023-02-14 NOTE — PROGRESS NOTES
[address] [facility]  Outpatient Physical Therapy    Treatment Note        Date: 2023  Patient: Osmel Velazquez  : 1955   Confirmed: Yes  MRN: [id number]  Referring Provider: Cristina Florence MD    Medical Diagnosis: Lumbosacral radiculitis [M54.17]       Treatment Diagnosis: increased pain, decreased pain free lumbar AROM, decreased bilateral LE & core & lumbar strength, decreased ability to perform functional mobility tasks/ADLs without pain, decreased functional endurance, & impaired postural awareness    Visit Information:  Insurance: Payor: [address] [place] [address] / Plan: MEDICARE PART A AND B / Product Type: *No Product type* /   PT Visit Information  PT Insurance Information: Medicare  Total # of Visits Approved: 99  Total # of Visits to Date: 7  Plan of Care/Certification Expiration Date: 23  No Show: 0  Progress Note Due Date: 23  Canceled Appointment: 0  Progress Note Counter: - (30 day PN due 23)    Subjective Information:  Subjective: I found that when I'm done if I go home and put some heat on it that makes all the difference. HEP Compliance:  [x] Good [] Fair [] Poor [] Reports not doing due to:    Pain Screening  Patient Currently in Pain: Denies    Treatment:  Exercises:  Exercises  Exercise 5: Cannon [place] Carry Single KB 15# 60' x 1 Kyle.   25# 60'  Exercise 10: Resisted Thoracic Rotations x 15 Apollo 50#  kyle  Exercise 11: paloff press Apollo, 50# 1 sets x 15 reps x 5 second hold each direction  Exercise 12: rows & triceps extension/lat pull downs, 1 set x 15 reps x 5 second hold Apollo 50#  Exercise 17: quadriped DLS ( kick backs) x 10 (pain reported posterior Right knee when into full Ext)  Exercise 18: hip 4-way, RTB, unilateral UE support, 1 set x 15 reps each motion each UE       Objective Measures:           LTG 3 Current Status[de-identified] 23: Flexion 100%, Ext 50%, SB Right 50%, SB L 75%, Rotation Right 75%, Left 75% (all pain free mobility reported) Assessment: Body Structures, Functions, Activity Limitations Requiring Skilled Therapeutic Intervention: Decreased functional mobility , Decreased ADL status, Decreased ROM, Decreased strength, Decreased endurance, Increased pain, Decreased posture  Assessment: Continued with progressions to focus on core strength and lumbar stability. Pt with improved Lumbar ROM with no pain reported with testing today with any direction. Treatment Diagnosis: increased pain, decreased pain free lumbar AROM, decreased bilateral LE & core & lumbar strength, decreased ability to perform functional mobility tasks/ADLs without pain, decreased functional endurance, & impaired postural awareness  Therapy Prognosis: Good       Patient Education: [x] NA       Post-Pain Assessment:       Pain Rating (0-10 pain scale):   0/10   Location and pain description same as pre-treatment unless indicated. Action: [] NA   [x] Perform HEP  [] Meds as prescribed  [] Modalities as prescribed   [] Call Physician     GOALS   Patient Goal(s): Patient Goals : \"walk further\"    Short Term Goals Completed by 2-4 weeks Goal Status   STG 1 The patient will have a decrease in Oswestry score >/=6 points in order to increase functional activity tolerance In progress   STG 2 The patient will demonstrate improved postural awareness requiring <25% verbal cueing during functional mobility tasks & exercises In progress     Long Term Goals Completed by 4-6 weeks Goal Status   LTG 1 The patient will demonstrate competency with HEP to progress towards self management of symptoms upon D/C In progress   LTG 2 The patient will demonstrate improved B LE & core & lumbar extension strength >/=4+/5 & Fair/Fair+ in order to perform functional mobility tasks with increased ease. In progress   LTG 3 The patient will demonstrate improved pain free lumbar AROM >/= 10-15* in order to enable patient to perform functional mobility tasks & ADLs with increased ease.  In progress LTG 4 The patient will ambulate >30 minutes independently with no device consistently in order to safely ambulate in the community & resume normal walking In progress       Plan:  Frequency/Duration:  Plan  Plan Frequency: 1-2xs/wk  Plan weeks: 4-6 weeks  Current Treatment Recommendations: Strengthening, ROM, Functional mobility training, Neuromuscular re-education, Manual, Pain management, Endurance training, Positioning, Modalities, Patient/Caregiver education & training, Equipment evaluation, education, & procurement, Home exercise program, Therapeutic activities, Safety education & training  Modalities: Heat/Cold, E-stim - unattended  Additional Comments: denies any active CA, pacemaker, seizures  Pt to continue current HEP. See objective section for any therapeutic exercise changes, additions or modifications this date.     Therapy Time:      PT Individual Minutes  Time In: 9879  Time Out: 4467  Minutes: 42  Timed Code Treatment Minutes: 42 Minutes  Procedure Minutes:0  Timed Activity Minutes Units   Ther Ex 42 3     Electronically signed by Annia Serna PTA on 2/14/23 at 3:17 PM EST

## 2023-02-17 ENCOUNTER — HOSPITAL ENCOUNTER (OUTPATIENT)
Dept: PHYSICAL THERAPY | Age: 68
Setting detail: THERAPIES SERIES
Discharge: HOME | End: 2023-02-17
Payer: MEDICARE

## 2023-02-21 ENCOUNTER — HOSPITAL ENCOUNTER (OUTPATIENT)
Dept: PHYSICAL THERAPY | Age: 68
Setting detail: THERAPIES SERIES
Discharge: HOME | End: 2023-02-21
Payer: MEDICARE

## 2023-02-21 PROCEDURE — 97110 THERAPEUTIC EXERCISES: CPT

## 2023-02-21 NOTE — PROGRESS NOTES
[address] [facility]  Outpatient Physical Therapy    Treatment Note        Date: 2023  Patient: Stephanie Wheatley  : 1955   Confirmed: Yes  MRN: [id number]  Referring Provider: Nuha Oswald MD    Medical Diagnosis: Lumbosacral radiculitis [M54.17]       Treatment Diagnosis: increased pain, decreased pain free lumbar AROM, decreased bilateral LE & core & lumbar strength, decreased ability to perform functional mobility tasks/ADLs without pain, decreased functional endurance, & impaired postural awareness    Visit Information:  Insurance: Payor: Sonya Geraldo / Plan: MEDICARE PART A AND B / Product Type: *No Product type* /   PT Visit Information  PT Insurance Information: Medicare  Total # of Visits Approved: 99  Total # of Visits to Date: 8  Plan of Care/Certification Expiration Date: 23  No Show: 0  Progress Note Due Date: 23  Canceled Appointment: 1  Progress Note Counter: - (30 day PN due 23)    Subjective Information:  Subjective: Pt reports right hand still swollen and painful. No LBP x 6 days  HEP Compliance:  [x] Good [] Fair [] Poor [] Reports not doing due to:         Treatment:  Exercises:  Exercises  Exercise 4: LTR, hook-lying, 1 set x 10 rep x 5 seconds each direction  Exercise 6: Seated T-band Lumbar Ext w/ Gray T-band x 20 with Moderate tension held by therapist.  Exercise 10: Resisted Thoracic Rotations x 20 Apollo 50#  thelma  Exercise 12: rows & triceps extension/lat pull downs, 1 set x 20 reps x 5 second hold Apollo 50#  Exercise 13: prone static stretch on elbows, 5 minutes  Exercise 17: quadriped DLS ( kick backs) x 15 (pain reported posterior Right knee when into full Ext)     *Indicates exercise, modality, or manual techniques to be initiated when appropriate    Objective Measures:      LTG 1 Current Status[de-identified] 23 States compliance with HEP. Assessment:    Body Structures, Functions, Activity Limitations Requiring Skilled Therapeutic Intervention: Decreased functional mobility , Decreased ADL status, Decreased ROM, Decreased strength, Decreased endurance, Increased pain, Decreased posture  Assessment: Continued with progressions to focus on core strength and lumbar stability. Modified hand  on apollo exercises secondary to right hand pain. Demonstrates good knowledge of exercises. Treatment Diagnosis: increased pain, decreased pain free lumbar AROM, decreased bilateral LE & core & lumbar strength, decreased ability to perform functional mobility tasks/ADLs without pain, decreased functional endurance, & impaired postural awareness  Therapy Prognosis: Good       Patient Education: [] NA       Post-Pain Assessment:       Pain Rating (0-10 pain scale):   0/10   Location and pain description same as pre-treatment unless indicated. Action: [] NA   [x] Perform HEP  [] Meds as prescribed  [] Modalities as prescribed   [] Call Physician     GOALS   Patient Goal(s): Patient Goals : \"walk further\"    Short Term Goals Completed by 2-4 weeks Goal Status   STG 1 The patient will have a decrease in Oswestry score >/=6 points in order to increase functional activity tolerance In progress   STG 2 The patient will demonstrate improved postural awareness requiring <25% verbal cueing during functional mobility tasks & exercises In progress       Long Term Goals Completed by 4-6 weeks Goal Status   LTG 1 The patient will demonstrate competency with HEP to progress towards self management of symptoms upon D/C In progress   LTG 2 The patient will demonstrate improved B LE & core & lumbar extension strength >/=4+/5 & Fair/Fair+ in order to perform functional mobility tasks with increased ease. In progress   LTG 3 The patient will demonstrate improved pain free lumbar AROM >/= 10-15* in order to enable patient to perform functional mobility tasks & ADLs with increased ease.  In progress   LTG 4 The patient will ambulate >30 minutes independently with no device consistently in order to safely ambulate in the community & resume normal walking In progress          Plan:  Frequency/Duration:     Pt to continue current HEP. See objective section for any therapeutic exercise changes, additions or modifications this date.     Therapy Time:      PT Individual Minutes  Time In: 6319  Time Out: 9974  Minutes: 38  Timed Code Treatment Minutes: 38 Minutes  Procedure Minutes:0    Timed Activity Minutes Units   Ther Ex 38 3     Electronically signed by Librado Elliott PTA on 2/21/23 at 4:17 PM EST

## 2023-02-24 ENCOUNTER — HOSPITAL ENCOUNTER (OUTPATIENT)
Dept: PHYSICAL THERAPY | Age: 68
Setting detail: THERAPIES SERIES
Discharge: HOME | End: 2023-02-24
Payer: MEDICARE

## 2023-02-24 PROCEDURE — 97140 MANUAL THERAPY 1/> REGIONS: CPT

## 2023-02-24 NOTE — PROGRESS NOTES
[address]  PHYSICAL THERAPY PLAN OF CARE   [address] [place], [address] [facility]         Ph: [phone]  Fax: [phone]    [] Certification  [] Recertification []  Plan of Care  [] Progress Note [x] Discharge      Referring Provider: Viry Gorman MD     From:  Pearl Age PT, DPT  Patient: Hubert Barrow (18 y.o. male) : 1955 Date: 2023  Medical Diagnosis: Lumbosacral radiculitis [M54.17]       Treatment Diagnosis: increased pain, decreased pain free lumbar AROM, decreased bilateral LE & core & lumbar strength, decreased ability to perform functional mobility tasks/ADLs without pain, decreased functional endurance, & impaired postural awareness    Plan of Care/Certification Expiration Date: 23   Progress Report Period from:  2023  to 2023    Visits to Date: 9 No Show: 0 Cancelled Appts: 1    OBJECTIVE:   Short Term Goals - Time Frame for Short Term Goals: 2-4 weeks    Goals Current/Discharge status  Status   Short Term Goal 1: The patient will have a decrease in Oswestry score >/=6 points in order to increase functional activity tolerance  STG 1 Current Status[de-identified] 23 150   Met   Short Term Goal 2: The patient will demonstrate improved postural awareness requiring <25% verbal cueing during functional mobility tasks & exercises  STG 2 Current Status[de-identified] 23 Pt requires <25% VC's for posture   Met     Long Term Goals - Time Frame for Long Term Goals : 4-6 weeks  Goals Current/ Discharge status Status   Long Term Goal 1: The patient will demonstrate competency with HEP to progress towards self management of symptoms upon D/C LTG 1 Current Status[de-identified] 23 Pt reports compliance/indep w/ HEP   Met   Long Term Goal 2: The patient will demonstrate improved B LE & core & lumbar extension strength >/=4+/5 & Fair/Fair+ in order to perform functional mobility tasks with increased ease.  LTG 2 Current Status[de-identified] 23 R and L LE strength 5/5 grossly, core strength good, lumbar ext strength good   Met   Long Term Goal 3: The patient will demonstrate improved pain free lumbar AROM >/= 10-15* in order to enable patient to perform functional mobility tasks & ADLs with increased ease. LTG 3 Current Status[de-identified] 2/24/23 flex 100%, 75%, B %, B rotation 100% - no issues w/ ADLs per pt   Met   Long Term Goal 4: The patient will ambulate >30 minutes independently with no device consistently in order to safely ambulate in the community & resume normal walking LTG 4 Current Status[de-identified] 2/24/23 Pt reports ambulating >30 minutes before fatigue/pain w/ no device   Met     Body Structures, Functions, Activity Limitations Requiring Skilled Therapeutic Intervention: Decreased functional mobility , Decreased ADL status, Decreased ROM, Decreased strength, Decreased endurance, Increased pain, Decreased posture  Assessment: Pt currently meeting all goals and is agreeable to D/C at this time. Pt indep and compliant w/ current HEP to self-manage sx's. Administered GTB for HEP progressions w/ pt verbalizing understanding. Therapy Prognosis: Good           PLAN: [] Evaluate and Treat  Frequency/Duration:  Additional Comments: D/C PT                            Patient Status:[] Continue/ Initiate plan of Care     [x] Discharge PT. Recommend pt continue with HEP. [] Additional visits requested, Please re-certify for additional visits:     [] Hold        Signature: Objective information by: Electronically signed by Marjorie Shipley PTA on 2/24/23 at 3:10 PM EST      If you have any questions or concerns, please don't hesitate to call. Thank you for your referral.    I have reviewed this plan of care and certify a need for medically necessary rehabilitation services.     Physician Signature:__________________________________________________________  Date:  Please sign and return

## 2023-02-24 NOTE — PROGRESS NOTES
[address] [facility]  Outpatient Physical Therapy    Treatment Note        Date: 2023  Patient: Pamela Martin  : 1955   Confirmed: Yes  MRN: [id number]  Referring Provider: Brittaney De Leon MD    Medical Diagnosis: Lumbosacral radiculitis [M54.17]       Treatment Diagnosis: increased pain, decreased pain free lumbar AROM, decreased bilateral LE & core & lumbar strength, decreased ability to perform functional mobility tasks/ADLs without pain, decreased functional endurance, & impaired postural awareness    Visit Information:  Insurance: Payor: Jorge Luis Borja / Plan: MEDICARE PART A AND B / Product Type: *No Product type* /   PT Visit Information  PT Insurance Information: Medicare  Total # of Visits Approved: 99  Total # of Visits to Date: 9  Plan of Care/Certification Expiration Date: 23  No Show: 0  Progress Note Due Date: 23  Canceled Appointment: 1  Progress Note Counter: - (30 day PN due 23)    Subjective Information:  Subjective: Pt reports \"it's night and day how much better I'm doing since starting therapy. \" Pt does state that he occassionally gets a \"twisting\" sensation in his left leg and while walking into dept he felt that today, otherwise states he hadn't felt that in about two weeks. HEP Compliance:  [x] Good [] Fair [] Poor [] Reports not doing due to:    Pain Screening  Patient Currently in Pain: Denies    Treatment:    *Indicates exercise, modality, or manual techniques to be initiated when appropriate    Objective Measures: x25 mins  Reviewed goals, progress towards goals, discussed HEP and administered GTB for HEP.  Pt declined need to perform ex's in dept and stated he can do it indep at home    STG 1 Current Status[de-identified] 23 150  STG 2 Current Status[de-identified] 23 Pt requires <25% VC's for posture    LTG 1 Current Status[de-identified] 23 Pt reports compliance/indep w/ HEP  LTG 2 Current Status[de-identified] 23 R and L LE strength 5/5 grossly, core strength good, lumbar ext strength good  LTG 3 Current Status[de-identified] 2/24/23 flex 100%, 75%, B %, B rotation 100% - no issues w/ ADLs per pt  LTG 4 Current Status[de-identified] 2/24/23 Pt reports ambulating >30 minutes before fatigue/pain w/ no device       Assessment: Body Structures, Functions, Activity Limitations Requiring Skilled Therapeutic Intervention: Decreased functional mobility , Decreased ADL status, Decreased ROM, Decreased strength, Decreased endurance, Increased pain, Decreased posture  Assessment: Pt currently meeting all goals and is agreeable to D/C at this time. Pt indep and compliant w/ current HEP to self-manage sx's. Administered GTB for HEP progressions w/ pt verbalizing understanding. Treatment Diagnosis: increased pain, decreased pain free lumbar AROM, decreased bilateral LE & core & lumbar strength, decreased ability to perform functional mobility tasks/ADLs without pain, decreased functional endurance, & impaired postural awareness  Therapy Prognosis: Good       Patient Education: [] NA   See above    Post-Pain Assessment:       Pain Rating (0-10 pain scale):   0/10   Location and pain description same as pre-treatment unless indicated.    Action: [] NA   [x] Perform HEP  [] Meds as prescribed  [] Modalities as prescribed   [] Call Physician     GOALS   Patient Goal(s): Patient Goals : \"walk further\"    Short Term Goals Completed by 2-4 weeks Goal Status   STG 1 The patient will have a decrease in Oswestry score >/=6 points in order to increase functional activity tolerance Met   STG 2 The patient will demonstrate improved postural awareness requiring <25% verbal cueing during functional mobility tasks & exercises Met     Long Term Goals Completed by 4-6 weeks Goal Status   LTG 1 The patient will demonstrate competency with HEP to progress towards self management of symptoms upon D/C Met   LTG 2 The patient will demonstrate improved B LE & core & lumbar extension strength >/=4+/5 & Fair/Fair+ in order to perform functional mobility tasks with increased ease. Met   LTG 3 The patient will demonstrate improved pain free lumbar AROM >/= 10-15* in order to enable patient to perform functional mobility tasks & ADLs with increased ease. Met   LTG 4 The patient will ambulate >30 minutes independently with no device consistently in order to safely ambulate in the community & resume normal walking Met          Plan:  Frequency/Duration:  Plan  Additional Comments: D/C PT  Pt to continue current HEP. See objective section for any therapeutic exercise changes, additions or modifications this date.     Therapy Time:      PT Individual Minutes  Time In: 1430  Time Out: 8526  Minutes: 25  Timed Code Treatment Minutes: 25 Minutes  Procedure Minutes:0    Timed Activity Minutes Units   Manual  25 2     Electronically signed by Faustina Alvarez PTA on 2/24/23 at 3:06 PM EST

## 2023-02-28 ENCOUNTER — APPOINTMENT (OUTPATIENT)
Dept: PHYSICAL THERAPY | Age: 68
End: 2023-02-28
Payer: MEDICARE

## 2023-07-27 ENCOUNTER — HOSPITAL ENCOUNTER (OUTPATIENT)
Dept: SLEEP CENTER | Age: 68
Discharge: HOME | End: 2023-07-29
Payer: MEDICARE

## 2023-07-27 PROCEDURE — 95806 SLEEP STUDY UNATT&RESP EFFT: CPT

## 2023-07-31 LAB
LV EF: 56 %
LVEF MODALITY: NORMAL

## 2023-08-07 LAB
ANION GAP IN SER/PLAS: 13 MMOL/L (ref 10–20)
ANION GAP SERPL CALCULATED.3IONS-SCNC: 13 MMOL/L (ref 10–20)
BICARBONATE: 23 MMOL/L (ref 21–32)
CALCIUM (MG/DL) IN SER/PLAS: 9.7 MG/DL (ref 8.6–10.3)
CALCIUM SERPL-MCNC: 9.7 MG/DL (ref 8.6–10.3)
CARBON DIOXIDE, TOTAL (MMOL/L) IN SER/PLAS: 23 MMOL/L (ref 21–32)
CHLORIDE (MMOL/L) IN SER/PLAS: 103 MMOL/L (ref 98–107)
CHLORIDE BLD-SCNC: 103 MMOL/L (ref 98–107)
CREAT SERPL-MCNC: 1.41 MG/DL (ref 0.5–1.3)
CREATININE (MG/DL) IN SER/PLAS: 1.41 MG/DL (ref 0.5–1.3)
EGFR MALE: 54 ML/MIN/1.73M2
ERYTHROCYTE DISTRIBUTION WIDTH (RATIO) BY AUTOMATED COUNT: 12.8 % (ref 11.5–14.5)
ERYTHROCYTE MEAN CORPUSCULAR HEMOGLOBIN CONCENTRATION (G/DL) BY AUTOMATED: 33.3 G/DL (ref 32–36)
ERYTHROCYTE MEAN CORPUSCULAR VOLUME (FL) BY AUTOMATED COUNT: 92 FL (ref 80–100)
ERYTHROCYTE [DISTWIDTH] IN BLOOD BY AUTOMATED COUNT: 12.8 % (ref 11.5–14.5)
ERYTHROCYTES (10*6/UL) IN BLOOD BY AUTOMATED COUNT: 4.57 X10E12/L (ref 4.5–5.9)
GFR MALE: 54 ML/MIN/1.73M2
GLUCOSE (MG/DL) IN SER/PLAS: 124 MG/DL (ref 74–99)
GLUCOSE: 124 MG/DL (ref 74–99)
HCT VFR BLD CALC: 42.1 % (ref 41–52)
HEMATOCRIT (%) IN BLOOD BY AUTOMATED COUNT: 42.1 % (ref 41–52)
HEMOGLOBIN (G/DL) IN BLOOD: 14 G/DL (ref 13.5–17.5)
HEMOGLOBIN: 14 G/DL (ref 13.5–17.5)
LEUKOCYTES (10*3/UL) IN BLOOD BY AUTOMATED COUNT: 4.4 X10E9/L (ref 4.4–11.3)
MAGNESIUM (MG/DL) IN SER/PLAS: 1.53 MG/DL (ref 1.6–2.4)
MAGNESIUM: 1.53 MG/DL (ref 1.6–2.4)
MCHC RBC AUTO-ENTMCNC: 33.3 G/DL (ref 32–36)
MCV RBC AUTO: 92 FL (ref 80–100)
PLATELET # BLD: 119 X10E9/L (ref 150–450)
PLATELETS (10*3/UL) IN BLOOD AUTOMATED COUNT: 119 X10E9/L (ref 150–450)
POTASSIUM (MMOL/L) IN SER/PLAS: 4.2 MMOL/L (ref 3.5–5.3)
POTASSIUM SERPL-SCNC: 4.2 MMOL/L (ref 3.5–5.3)
RBC # BLD: 4.57 X10E12/L (ref 4.5–5.9)
SODIUM (MMOL/L) IN SER/PLAS: 135 MMOL/L (ref 136–145)
SODIUM BLD-SCNC: 135 MMOL/L (ref 136–145)
UREA NITROGEN (MG/DL) IN SER/PLAS: 20 MG/DL (ref 6–23)
UREA NITROGEN: 20 MG/DL (ref 6–23)
WBC: 4.4 X10E9/L (ref 4.4–11.3)

## 2023-09-19 PROBLEM — G47.33 OSA (OBSTRUCTIVE SLEEP APNEA): Status: ACTIVE | Noted: 2023-09-19

## 2023-09-26 LAB
ANION GAP IN SER/PLAS: 12 MMOL/L (ref 10–20)
ANION GAP SERPL CALCULATED.3IONS-SCNC: 12 MMOL/L (ref 10–20)
BICARBONATE: 27 MMOL/L (ref 21–32)
CALCIUM (MG/DL) IN SER/PLAS: 9.3 MG/DL (ref 8.6–10.3)
CALCIUM SERPL-MCNC: 9.3 MG/DL (ref 8.6–10.3)
CARBON DIOXIDE, TOTAL (MMOL/L) IN SER/PLAS: 27 MMOL/L (ref 21–32)
CHLORIDE (MMOL/L) IN SER/PLAS: 105 MMOL/L (ref 98–107)
CHLORIDE BLD-SCNC: 105 MMOL/L (ref 98–107)
CREAT SERPL-MCNC: 1.39 MG/DL (ref 0.5–1.3)
CREATININE (MG/DL) IN SER/PLAS: 1.39 MG/DL (ref 0.5–1.3)
EGFR MALE: 55 ML/MIN/1.73M2
ERYTHROCYTE DISTRIBUTION WIDTH (RATIO) BY AUTOMATED COUNT: 13.5 % (ref 11.5–14.5)
ERYTHROCYTE MEAN CORPUSCULAR HEMOGLOBIN CONCENTRATION (G/DL) BY AUTOMATED: 32.5 G/DL (ref 32–36)
ERYTHROCYTE MEAN CORPUSCULAR VOLUME (FL) BY AUTOMATED COUNT: 94 FL (ref 80–100)
ERYTHROCYTE [DISTWIDTH] IN BLOOD BY AUTOMATED COUNT: 13.5 % (ref 11.5–14.5)
ERYTHROCYTES (10*6/UL) IN BLOOD BY AUTOMATED COUNT: 4.69 X10E12/L (ref 4.5–5.9)
GFR MALE: 55 ML/MIN/1.73M2
GLUCOSE (MG/DL) IN SER/PLAS: 111 MG/DL (ref 74–99)
GLUCOSE: 111 MG/DL (ref 74–99)
HCT VFR BLD CALC: 44.3 % (ref 41–52)
HEMATOCRIT (%) IN BLOOD BY AUTOMATED COUNT: 44.3 % (ref 41–52)
HEMOGLOBIN (G/DL) IN BLOOD: 14.4 G/DL (ref 13.5–17.5)
HEMOGLOBIN: 14.4 G/DL (ref 13.5–17.5)
LEUKOCYTES (10*3/UL) IN BLOOD BY AUTOMATED COUNT: 3.8 X10E9/L (ref 4.4–11.3)
MAGNESIUM (MG/DL) IN SER/PLAS: 1.76 MG/DL (ref 1.6–2.4)
MAGNESIUM: 1.76 MG/DL (ref 1.6–2.4)
MCHC RBC AUTO-ENTMCNC: 32.5 G/DL (ref 32–36)
MCV RBC AUTO: 94 FL (ref 80–100)
PLATELET # BLD: 136 X10E9/L (ref 150–450)
PLATELETS (10*3/UL) IN BLOOD AUTOMATED COUNT: 136 X10E9/L (ref 150–450)
POTASSIUM (MMOL/L) IN SER/PLAS: 4.8 MMOL/L (ref 3.5–5.3)
POTASSIUM SERPL-SCNC: 4.8 MMOL/L (ref 3.5–5.3)
RBC # BLD: 4.69 X10E12/L (ref 4.5–5.9)
SODIUM (MMOL/L) IN SER/PLAS: 139 MMOL/L (ref 136–145)
SODIUM BLD-SCNC: 139 MMOL/L (ref 136–145)
UREA NITROGEN (MG/DL) IN SER/PLAS: 16 MG/DL (ref 6–23)
UREA NITROGEN: 16 MG/DL (ref 6–23)
WBC: 3.8 X10E9/L (ref 4.4–11.3)

## 2023-09-29 ENCOUNTER — TRANSCRIBE ORDERS (OUTPATIENT)
Dept: CARDIOLOGY | Facility: CLINIC | Age: 68
End: 2023-09-29
Payer: MEDICARE

## 2023-09-29 DIAGNOSIS — G47.33 OBSTRUCTIVE SLEEP APNEA: ICD-10-CM

## 2023-12-22 PROBLEM — H93.A9 PULSATILE TINNITUS: Status: ACTIVE | Noted: 2023-12-22

## 2023-12-22 PROBLEM — J34.89 NASAL OBSTRUCTION: Status: ACTIVE | Noted: 2023-12-22

## 2023-12-22 PROBLEM — E78.5 HYPERLIPIDEMIA LDL GOAL <100: Status: ACTIVE | Noted: 2023-09-11

## 2023-12-22 PROBLEM — E11.42 DIABETIC POLYNEUROPATHY ASSOCIATED WITH TYPE 2 DIABETES MELLITUS (MULTI): Status: ACTIVE | Noted: 2023-09-11

## 2023-12-22 PROBLEM — Z90.5 HISTORY OF NEPHRECTOMY, LEFT: Status: ACTIVE | Noted: 2023-09-11

## 2023-12-22 PROBLEM — E11.29 TYPE 2 DIABETES MELLITUS WITH OTHER DIABETIC KIDNEY COMPLICATION (MULTI): Status: ACTIVE | Noted: 2023-09-11

## 2023-12-22 PROBLEM — Z85.528 H/O RENAL CELL CANCER: Status: ACTIVE | Noted: 2023-12-22

## 2023-12-22 PROBLEM — G47.33 OSA (OBSTRUCTIVE SLEEP APNEA): Status: ACTIVE | Noted: 2023-09-19

## 2023-12-22 PROBLEM — R51.9 FACIAL PAIN: Status: ACTIVE | Noted: 2023-12-22

## 2023-12-22 PROBLEM — H69.91 DYSFUNCTION OF RIGHT EUSTACHIAN TUBE: Status: ACTIVE | Noted: 2023-12-22

## 2023-12-22 PROBLEM — H90.71 MIXED HEARING LOSS OF RIGHT EAR: Status: ACTIVE | Noted: 2023-12-22

## 2023-12-22 PROBLEM — E78.6 LOW HDL (UNDER 40): Status: ACTIVE | Noted: 2023-09-11

## 2023-12-22 PROBLEM — I11.9 HYPERTENSIVE HEART DISEASE WITHOUT HEART FAILURE: Status: ACTIVE | Noted: 2023-09-11

## 2023-12-22 PROBLEM — E79.0 HYPERURICEMIA: Status: ACTIVE | Noted: 2023-09-11

## 2023-12-22 RX ORDER — NAPROXEN 500 MG/1
500 TABLET ORAL
COMMUNITY
End: 2024-03-29

## 2023-12-22 RX ORDER — ALLOPURINOL 300 MG/1
300 TABLET ORAL DAILY
COMMUNITY
End: 2024-03-29

## 2023-12-22 RX ORDER — METFORMIN HYDROCHLORIDE 1000 MG/1
1000 TABLET ORAL
COMMUNITY
End: 2024-03-29

## 2023-12-22 RX ORDER — LISINOPRIL 20 MG/1
20 TABLET ORAL DAILY
COMMUNITY

## 2023-12-22 RX ORDER — MELOXICAM 15 MG/1
15 TABLET ORAL
COMMUNITY
Start: 2019-07-18 | End: 2024-03-29

## 2023-12-22 RX ORDER — ROSUVASTATIN CALCIUM 40 MG/1
40 TABLET, COATED ORAL
COMMUNITY
End: 2024-03-29

## 2024-03-01 ENCOUNTER — HOSPITAL ENCOUNTER (OUTPATIENT)
Dept: LAB | Age: 69
Discharge: HOME | End: 2024-03-01
Payer: MEDICARE

## 2024-03-01 LAB
ALBUMIN SERPL-MCNC: 4.2 G/DL (ref 3.5–4.6)
ALP SERPL-CCNC: 65 U/L (ref 35–104)
ALT SERPL-CCNC: 56 U/L (ref 0–41)
ANION GAP SERPL CALCULATED.3IONS-SCNC: 12 MEQ/L (ref 9–15)
AST SERPL-CCNC: 33 U/L (ref 0–40)
BILIRUB SERPL-MCNC: 0.4 MG/DL (ref 0.2–0.7)
BUN SERPL-MCNC: 18 MG/DL (ref 8–23)
CALCIUM SERPL-MCNC: 9.3 MG/DL (ref 8.5–9.9)
CHLORIDE SERPL-SCNC: 105 MEQ/L (ref 95–107)
CHOLEST SERPL-MCNC: 179 MG/DL (ref 0–199)
CO2 SERPL-SCNC: 25 MEQ/L (ref 20–31)
CREAT SERPL-MCNC: 1.31 MG/DL (ref 0.7–1.2)
GLOBULIN SER CALC-MCNC: 3 G/DL (ref 2.3–3.5)
GLUCOSE SERPL-MCNC: 109 MG/DL (ref 70–99)
HDLC SERPL-MCNC: 34 MG/DL (ref 40–59)
LDLC SERPL CALC-MCNC: 115 MG/DL (ref 0–129)
POTASSIUM SERPL-SCNC: 4.5 MEQ/L (ref 3.4–4.9)
PROT SERPL-MCNC: 7.2 G/DL (ref 6.3–8)
SODIUM SERPL-SCNC: 142 MEQ/L (ref 135–144)
TRIGL SERPL-MCNC: 148 MG/DL (ref 0–150)

## 2024-03-01 PROCEDURE — 36415 COLL VENOUS BLD VENIPUNCTURE: CPT

## 2024-03-01 PROCEDURE — 80053 COMPREHEN METABOLIC PANEL: CPT

## 2024-03-01 PROCEDURE — 80061 LIPID PANEL: CPT

## 2024-03-25 ENCOUNTER — LAB (OUTPATIENT)
Dept: LAB | Facility: LAB | Age: 69
End: 2024-03-25
Payer: MEDICARE

## 2024-03-25 DIAGNOSIS — I47.10 SUPRAVENTRICULAR TACHYCARDIA (CMS-HCC): ICD-10-CM

## 2024-03-25 DIAGNOSIS — I48.0 PAROXYSMAL ATRIAL FIBRILLATION (MULTI): Primary | ICD-10-CM

## 2024-03-25 DIAGNOSIS — E78.5 HYPERLIPIDEMIA, UNSPECIFIED: ICD-10-CM

## 2024-03-25 DIAGNOSIS — I10 ESSENTIAL (PRIMARY) HYPERTENSION: ICD-10-CM

## 2024-03-25 DIAGNOSIS — I49.3 VENTRICULAR PREMATURE DEPOLARIZATION: ICD-10-CM

## 2024-03-25 DIAGNOSIS — E83.42 HYPOMAGNESEMIA: ICD-10-CM

## 2024-03-25 DIAGNOSIS — R00.1 BRADYCARDIA, UNSPECIFIED: ICD-10-CM

## 2024-03-25 LAB
ALBUMIN SERPL BCP-MCNC: 4.3 G/DL (ref 3.4–5)
ALP SERPL-CCNC: 58 U/L (ref 33–136)
ALT SERPL W P-5'-P-CCNC: 49 U/L (ref 10–52)
ANION GAP SERPL CALC-SCNC: 14 MMOL/L (ref 10–20)
AST SERPL W P-5'-P-CCNC: 42 U/L (ref 9–39)
BASOPHILS # BLD AUTO: 0.07 X10*3/UL (ref 0–0.1)
BASOPHILS NFR BLD AUTO: 1.2 %
BILIRUB DIRECT SERPL-MCNC: 0.2 MG/DL (ref 0–0.3)
BILIRUB SERPL-MCNC: 1 MG/DL (ref 0–1.2)
BUN SERPL-MCNC: 17 MG/DL (ref 6–23)
CALCIUM SERPL-MCNC: 9.6 MG/DL (ref 8.6–10.3)
CHLORIDE SERPL-SCNC: 101 MMOL/L (ref 98–107)
CHOLEST SERPL-MCNC: 218 MG/DL (ref 0–199)
CHOLESTEROL/HDL RATIO: 6.4
CO2 SERPL-SCNC: 26 MMOL/L (ref 21–32)
CREAT SERPL-MCNC: 1.56 MG/DL (ref 0.5–1.3)
EGFRCR SERPLBLD CKD-EPI 2021: 48 ML/MIN/1.73M*2
EOSINOPHIL # BLD AUTO: 0.16 X10*3/UL (ref 0–0.7)
EOSINOPHIL NFR BLD AUTO: 2.7 %
ERYTHROCYTE [DISTWIDTH] IN BLOOD BY AUTOMATED COUNT: 13.1 % (ref 11.5–14.5)
GLUCOSE SERPL-MCNC: 110 MG/DL (ref 74–99)
HCT VFR BLD AUTO: 46.7 % (ref 41–52)
HDLC SERPL-MCNC: 33.8 MG/DL
HGB BLD-MCNC: 15.4 G/DL (ref 13.5–17.5)
IMM GRANULOCYTES # BLD AUTO: 0 X10*3/UL (ref 0–0.7)
IMM GRANULOCYTES NFR BLD AUTO: 0 % (ref 0–0.9)
LDLC SERPL CALC-MCNC: 149 MG/DL
LYMPHOCYTES # BLD AUTO: 1.76 X10*3/UL (ref 1.2–4.8)
LYMPHOCYTES NFR BLD AUTO: 29.6 %
MAGNESIUM SERPL-MCNC: 1.76 MG/DL (ref 1.6–2.4)
MCH RBC QN AUTO: 30 PG (ref 26–34)
MCHC RBC AUTO-ENTMCNC: 33 G/DL (ref 32–36)
MCV RBC AUTO: 91 FL (ref 80–100)
MONOCYTES # BLD AUTO: 0.41 X10*3/UL (ref 0.1–1)
MONOCYTES NFR BLD AUTO: 6.9 %
NEUTROPHILS # BLD AUTO: 3.55 X10*3/UL (ref 1.2–7.7)
NEUTROPHILS NFR BLD AUTO: 59.6 %
NON HDL CHOLESTEROL: 184 MG/DL (ref 0–149)
NRBC BLD-RTO: 0 /100 WBCS (ref 0–0)
PLATELET # BLD AUTO: 143 X10*3/UL (ref 150–450)
POTASSIUM SERPL-SCNC: 4.2 MMOL/L (ref 3.5–5.3)
PROT SERPL-MCNC: 7.3 G/DL (ref 6.4–8.2)
RBC # BLD AUTO: 5.13 X10*6/UL (ref 4.5–5.9)
SODIUM SERPL-SCNC: 137 MMOL/L (ref 136–145)
TRIGL SERPL-MCNC: 174 MG/DL (ref 0–149)
TSH SERPL-ACNC: 4.23 MIU/L (ref 0.44–3.98)
VLDL: 35 MG/DL (ref 0–40)
WBC # BLD AUTO: 6 X10*3/UL (ref 4.4–11.3)

## 2024-03-25 PROCEDURE — 80061 LIPID PANEL: CPT

## 2024-03-25 PROCEDURE — 80053 COMPREHEN METABOLIC PANEL: CPT

## 2024-03-25 PROCEDURE — 85025 COMPLETE CBC W/AUTO DIFF WBC: CPT

## 2024-03-25 PROCEDURE — 82248 BILIRUBIN DIRECT: CPT

## 2024-03-25 PROCEDURE — 83735 ASSAY OF MAGNESIUM: CPT

## 2024-03-25 PROCEDURE — 36415 COLL VENOUS BLD VENIPUNCTURE: CPT

## 2024-03-25 PROCEDURE — 84443 ASSAY THYROID STIM HORMONE: CPT

## 2024-03-29 ENCOUNTER — OFFICE VISIT (OUTPATIENT)
Dept: CARDIOLOGY | Facility: CLINIC | Age: 69
End: 2024-03-29
Payer: MEDICARE

## 2024-03-29 VITALS
WEIGHT: 315 LBS | DIASTOLIC BLOOD PRESSURE: 75 MMHG | HEIGHT: 78 IN | HEART RATE: 60 BPM | SYSTOLIC BLOOD PRESSURE: 114 MMHG | BODY MASS INDEX: 36.45 KG/M2

## 2024-03-29 DIAGNOSIS — E78.5 HYPERLIPIDEMIA LDL GOAL <100: ICD-10-CM

## 2024-03-29 DIAGNOSIS — E78.6 LOW HDL (UNDER 40): Primary | ICD-10-CM

## 2024-03-29 DIAGNOSIS — I11.9 HYPERTENSIVE HEART DISEASE WITHOUT HEART FAILURE: ICD-10-CM

## 2024-03-29 DIAGNOSIS — Z90.5 HISTORY OF NEPHRECTOMY, LEFT: ICD-10-CM

## 2024-03-29 DIAGNOSIS — E11.29 TYPE 2 DIABETES MELLITUS WITH OTHER DIABETIC KIDNEY COMPLICATION (MULTI): ICD-10-CM

## 2024-03-29 DIAGNOSIS — Z85.528 H/O RENAL CELL CANCER: ICD-10-CM

## 2024-03-29 DIAGNOSIS — G47.33 OSA (OBSTRUCTIVE SLEEP APNEA): ICD-10-CM

## 2024-03-29 PROCEDURE — 3078F DIAST BP <80 MM HG: CPT | Performed by: INTERNAL MEDICINE

## 2024-03-29 PROCEDURE — 3050F LDL-C >= 130 MG/DL: CPT | Performed by: INTERNAL MEDICINE

## 2024-03-29 PROCEDURE — 1159F MED LIST DOCD IN RCRD: CPT | Performed by: INTERNAL MEDICINE

## 2024-03-29 PROCEDURE — 4010F ACE/ARB THERAPY RXD/TAKEN: CPT | Performed by: INTERNAL MEDICINE

## 2024-03-29 PROCEDURE — 3074F SYST BP LT 130 MM HG: CPT | Performed by: INTERNAL MEDICINE

## 2024-03-29 PROCEDURE — 99214 OFFICE O/P EST MOD 30 MIN: CPT | Performed by: INTERNAL MEDICINE

## 2024-03-29 PROCEDURE — 1036F TOBACCO NON-USER: CPT | Performed by: INTERNAL MEDICINE

## 2024-03-29 PROCEDURE — 93000 ELECTROCARDIOGRAM COMPLETE: CPT | Performed by: INTERNAL MEDICINE

## 2024-03-29 RX ORDER — FLUTICASONE PROPIONATE 50 MCG
1 SPRAY, SUSPENSION (ML) NASAL 2 TIMES DAILY
COMMUNITY

## 2024-03-29 RX ORDER — FLASH GLUCOSE SENSOR
KIT MISCELLANEOUS
COMMUNITY
Start: 2024-02-28

## 2024-03-29 RX ORDER — INSULIN DEGLUDEC 100 U/ML
4 INJECTION, SOLUTION SUBCUTANEOUS DAILY PRN
COMMUNITY
Start: 2024-02-22 | End: 2025-02-21

## 2024-03-29 RX ORDER — SEMAGLUTIDE 0.68 MG/ML
0.5 INJECTION, SOLUTION SUBCUTANEOUS
COMMUNITY
Start: 2023-11-09

## 2024-03-29 ASSESSMENT — ENCOUNTER SYMPTOMS
DEPRESSION: 0
LOSS OF SENSATION IN FEET: 0
OCCASIONAL FEELINGS OF UNSTEADINESS: 0

## 2024-03-29 NOTE — PROGRESS NOTES
CARDIOLOGY OFFICE NOTE     Date:   3/29/2024    Patient:    Brando Barrett    YOB: 1955    Primary Physician: Kristen Araiza MD       Reason for Visit: 6-month follow-up.    HPI:     Brando Barrett was seen in cardiac evaluation at the  Cardiology office March 29, 2024.      The patients problems are listed as in the impression below.    Electronic medical records reviewed.    Patient returns.  He is feeling well.  He has no complaints.    He is on Ozempic now and is lost 15 pounds approximately.    Patient denies Chest Pain, SOB, Lightheadedness, Dizziness, TIA or CVA symptoms.  No CHF or Edema.  No Palpitations.  No GI,  or Bleeding Issues. No Recent Fever or Chills.     Cardiovascular and general review of systems is otherwise negative.    A 14-system review is otherwise negative, other than noted.     PHYSICAL EXAMINATION:      Vitals:    03/29/24 0901   BP: 114/75   Pulse: 60     General: No acute distress. Vital signs as noted. Alert and oriented.  Head And Neck Examination: No jugular venous distention, no carotid bruits, no mass. Carotid upstrokes preserved. Oral mucosa moist. No xanthelasma. Head and neck examination otherwise unremarkable.  Lungs: Clear to auscultation and percussion. No wheezes, no rales, and no rhonchi.  Chest: Excursion appeared to be normal. No chest wall tenderness on palpation.  Heart: Normal S1 and S2. No S3. No S4. No rub. Grade 1/6 systolic murmur, best heard at the left sternal border. Point of maximal impulse was within normal limits.  Abdomen: Soft. Nontender. No organomegaly. No bruits. No masses. Obese.  Extremities: No bipedal edema. No clubbing. No cyanosis. Pulses are strong throughout. No bruits.  Musculoskeletal Exam: No ulcers, otherwise unremarkable.  Neuro: Neurologically appeared grossly intact.  .  IMPRESSION:      Cardiovascular status stable   Asymptomatic  Bradycardia  Abnormal rest electrocardiogram  Sick sinus syndrome, abnormal 7-day  event monitor  PAF, prior history of atrial fibrillation  PSVT  PVCs frequent  Normal LV function ejection fraction 65 to 70%, echocardiogram 7/2023.  Left ventricular hypertrophy, mild  No valvular heart disease history  Negative Lexiscan perfusion study for MI or ischemia. LVEF 56%. 7/2023.  Hypertension  Hyperlipidemia  Diabetes  Abnormal rest electrocardiogram  Incomplete right bundle branch block  Poor wave enter progression question prior anterior septal MI  Obstructive sleep apnea on CPAP in the past, posterior septal nasal surgery  Renal cell carcinoma status post left nephrectomy 1991  Prior hernia or surgery  Elevated liver function studies  Renal insufficiency  Otherwise as per assessment below.    RECOMMENDATIONS:      Patient continues to do well overall.  Would suggest continuing current medications.  Refills were provided.    We did discuss his cholesterol level with his  and him being diabetic.  His LDL level should be less than 70.  Will obtain a CT calcium score for risk assessment as well.  Most likely will start Repatha if his cholesterol levels are still high which I am assuming they will be.    Exercise dietary weight reduction program was encouraged.  Hydration.    [employer] portal use was encouraged.    We will plan to see back in 6 months with CT calcium score, laboratory Studies and ECG as ordered.     Patient will follow up with their primary physician for general care.    The patient knows to contact medical care earlier if need be.      ALLERGIES:     Allergies   Allergen Reactions    Codeine Other    Magnesium Oxide Diarrhea    Procaine Other     Other Reaction(s): tachycardia    Morphine Other and Nausea And Vomiting     nausea    Other Reaction(s): GI Upset        MEDICATIONS:     Current Outpatient Medications   Medication Instructions    fluticasone (Flonase) 50 mcg/actuation nasal spray 1 spray, Each Nostril, 2 times daily, Shake gently. Before first use, prime pump. After  use, clean tip and replace cap.    FreeStyle Demetria 2 Sensor kit subcutaneous, Every 14 days    lisinopril 20 mg, oral, Daily    Ozempic 0.5 mg, subcutaneous, Weekly    Tresiba FlexTouch U-100 4 Units, subcutaneous, Daily PRN       ELECTROCARDIOGRAM:      Sinus rhythm, poor wave anterior progression, low voltage, cannot exclude old anterior MI.  Rate 60.    CARDIAC TESTING:      None    LABORATORY DATA:      CBC:   Lab Results   Component Value Date    WBC 6.0 03/25/2024    RBC 5.13 03/25/2024    HGB 15.4 03/25/2024    HCT 46.7 03/25/2024     (L) 03/25/2024        CMP:    Lab Results   Component Value Date     03/25/2024    K 4.2 03/25/2024     03/25/2024    CO2 26 03/25/2024    BUN 17 03/25/2024    CREATININE 1.56 (H) 03/25/2024    GLUCOSE 110 (H) 03/25/2024    CALCIUM 9.6 03/25/2024       Magnesium:    Lab Results   Component Value Date    MG 1.76 03/25/2024       Lipid Profile:    Lab Results   Component Value Date    CHOL 218 (H) 03/25/2024    TRIG 174 (H) 03/25/2024    HDL 33.8 03/25/2024       Hepatic Function Panel:    Lab Results   Component Value Date    ALKPHOS 58 03/25/2024    ALT 49 03/25/2024    AST 42 (H) 03/25/2024    PROT 7.3 03/25/2024    BILITOT 1.0 03/25/2024    BILIDIR 0.2 03/25/2024       TSH:    Lab Results   Component Value Date    TSH 4.23 (H) 03/25/2024                   PROBLEM LIST:     Patient Active Problem List   Diagnosis    Diabetic polyneuropathy associated with type 2 diabetes mellitus (CMS/HCC)    Dysfunction of right eustachian tube    Facial pain    H/O renal cell cancer    History of nephrectomy, left    Hyperlipidemia LDL goal <100    Hypertensive heart disease without heart failure    Hyperuricemia    Low HDL (under 40)    Mixed hearing loss of right ear    Nasal obstruction    RUTH (obstructive sleep apnea)    Pulsatile tinnitus    Type 2 diabetes mellitus with other diabetic kidney complication (CMS/HCC)             Arsh Posey MD, FACC   [facility] /  [facility] /  Cardiology      Of Note:  [facility] voice recognition dictation software was utilized partially in the preparation of this note, therefore, inaccuracies in spelling, word choice and punctuation may have occurred which were not recognized the time of signing.    Patient was seen and examined with total time of visit including chart preparation, rooming, and chart completion exceeding 40 minutes.      ----

## 2024-04-24 ENCOUNTER — HOSPITAL ENCOUNTER (OUTPATIENT)
Dept: RADIOLOGY | Facility: CLINIC | Age: 69
Discharge: HOME | End: 2024-04-24
Payer: MEDICARE

## 2024-04-24 DIAGNOSIS — Z85.528 H/O RENAL CELL CANCER: ICD-10-CM

## 2024-04-24 DIAGNOSIS — I11.9 HYPERTENSIVE HEART DISEASE WITHOUT HEART FAILURE: ICD-10-CM

## 2024-04-24 DIAGNOSIS — G47.33 OSA (OBSTRUCTIVE SLEEP APNEA): ICD-10-CM

## 2024-04-24 DIAGNOSIS — E78.5 HYPERLIPIDEMIA LDL GOAL <100: ICD-10-CM

## 2024-04-24 DIAGNOSIS — E78.6 LOW HDL (UNDER 40): ICD-10-CM

## 2024-04-24 DIAGNOSIS — Z90.5 HISTORY OF NEPHRECTOMY, LEFT: ICD-10-CM

## 2024-04-24 DIAGNOSIS — E11.29 TYPE 2 DIABETES MELLITUS WITH OTHER DIABETIC KIDNEY COMPLICATION (MULTI): ICD-10-CM

## 2024-04-24 PROCEDURE — 75571 CT HRT W/O DYE W/CA TEST: CPT

## 2024-08-23 ENCOUNTER — HOSPITAL ENCOUNTER (OUTPATIENT)
Dept: LAB | Age: 69
Discharge: HOME | End: 2024-08-23
Payer: MEDICARE

## 2024-08-23 LAB
ALBUMIN SERPL-MCNC: 4 G/DL (ref 3.5–4.6)
ALP SERPL-CCNC: 69 U/L (ref 35–104)
ALT SERPL-CCNC: 24 U/L (ref 0–41)
AST SERPL-CCNC: 32 U/L (ref 0–40)
BILIRUB DIRECT SERPL-MCNC: <0.2 MG/DL (ref 0–0.4)
BILIRUB INDIRECT SERPL-MCNC: NORMAL MG/DL (ref 0–0.6)
BILIRUB SERPL-MCNC: 0.5 MG/DL (ref 0.2–0.7)
PROT SERPL-MCNC: 7 G/DL (ref 6.3–8)
TSH SERPL-MCNC: 2.19 UIU/ML (ref 0.44–3.86)

## 2024-08-23 PROCEDURE — 80076 HEPATIC FUNCTION PANEL: CPT

## 2024-08-23 PROCEDURE — 83036 HEMOGLOBIN GLYCOSYLATED A1C: CPT

## 2024-08-23 PROCEDURE — 84443 ASSAY THYROID STIM HORMONE: CPT

## 2024-08-23 PROCEDURE — 36415 COLL VENOUS BLD VENIPUNCTURE: CPT

## 2024-08-24 LAB
ESTIMATED AVERAGE GLUCOSE: 117 MG/DL
HBA1C MFR BLD: 5.7 % (ref 4–6)

## 2024-09-16 ENCOUNTER — LAB (OUTPATIENT)
Dept: LAB | Facility: LAB | Age: 69
End: 2024-09-16
Payer: MEDICARE

## 2024-09-16 DIAGNOSIS — E11.29 TYPE 2 DIABETES MELLITUS WITH OTHER DIABETIC KIDNEY COMPLICATION: ICD-10-CM

## 2024-09-16 DIAGNOSIS — Z85.528 H/O RENAL CELL CANCER: ICD-10-CM

## 2024-09-16 DIAGNOSIS — Z90.5 HISTORY OF NEPHRECTOMY, LEFT: ICD-10-CM

## 2024-09-16 DIAGNOSIS — E78.5 HYPERLIPIDEMIA LDL GOAL <100: ICD-10-CM

## 2024-09-16 DIAGNOSIS — I11.9 HYPERTENSIVE HEART DISEASE WITHOUT HEART FAILURE: ICD-10-CM

## 2024-09-16 DIAGNOSIS — E78.6 LOW HDL (UNDER 40): ICD-10-CM

## 2024-09-16 DIAGNOSIS — G47.33 OSA (OBSTRUCTIVE SLEEP APNEA): ICD-10-CM

## 2024-09-16 LAB
ALBUMIN SERPL BCP-MCNC: 4.2 G/DL (ref 3.4–5)
ALP SERPL-CCNC: 65 U/L (ref 33–136)
ALT SERPL W P-5'-P-CCNC: 19 U/L (ref 10–52)
ANION GAP SERPL CALC-SCNC: 12 MMOL/L (ref 10–20)
AST SERPL W P-5'-P-CCNC: 21 U/L (ref 9–39)
BILIRUB DIRECT SERPL-MCNC: 0.2 MG/DL (ref 0–0.3)
BILIRUB SERPL-MCNC: 0.7 MG/DL (ref 0–1.2)
BUN SERPL-MCNC: 13 MG/DL (ref 6–23)
CALCIUM SERPL-MCNC: 9.3 MG/DL (ref 8.6–10.3)
CHLORIDE SERPL-SCNC: 104 MMOL/L (ref 98–107)
CO2 SERPL-SCNC: 27 MMOL/L (ref 21–32)
CREAT SERPL-MCNC: 1.37 MG/DL (ref 0.5–1.3)
EGFRCR SERPLBLD CKD-EPI 2021: 56 ML/MIN/1.73M*2
ERYTHROCYTE [DISTWIDTH] IN BLOOD BY AUTOMATED COUNT: 12.6 % (ref 11.5–14.5)
GLUCOSE SERPL-MCNC: 102 MG/DL (ref 74–99)
HCT VFR BLD AUTO: 44.5 % (ref 41–52)
HGB BLD-MCNC: 15.1 G/DL (ref 13.5–17.5)
MAGNESIUM SERPL-MCNC: 1.79 MG/DL (ref 1.6–2.4)
MCH RBC QN AUTO: 30.1 PG (ref 26–34)
MCHC RBC AUTO-ENTMCNC: 33.9 G/DL (ref 32–36)
MCV RBC AUTO: 89 FL (ref 80–100)
NRBC BLD-RTO: 0 /100 WBCS (ref 0–0)
PLATELET # BLD AUTO: 165 X10*3/UL (ref 150–450)
POTASSIUM SERPL-SCNC: 4.3 MMOL/L (ref 3.5–5.3)
PROT SERPL-MCNC: 7.3 G/DL (ref 6.4–8.2)
RBC # BLD AUTO: 5.02 X10*6/UL (ref 4.5–5.9)
SODIUM SERPL-SCNC: 139 MMOL/L (ref 136–145)
TSH SERPL-ACNC: 3.52 MIU/L (ref 0.44–3.98)
WBC # BLD AUTO: 5.1 X10*3/UL (ref 4.4–11.3)

## 2024-09-16 PROCEDURE — 84443 ASSAY THYROID STIM HORMONE: CPT

## 2024-09-16 PROCEDURE — 83735 ASSAY OF MAGNESIUM: CPT

## 2024-09-16 PROCEDURE — 36415 COLL VENOUS BLD VENIPUNCTURE: CPT

## 2024-09-16 PROCEDURE — 82248 BILIRUBIN DIRECT: CPT

## 2024-09-16 PROCEDURE — 80053 COMPREHEN METABOLIC PANEL: CPT

## 2024-09-16 PROCEDURE — 85027 COMPLETE CBC AUTOMATED: CPT

## 2024-09-20 ENCOUNTER — APPOINTMENT (OUTPATIENT)
Dept: CARDIOLOGY | Facility: CLINIC | Age: 69
End: 2024-09-20
Payer: MEDICARE

## 2024-09-20 VITALS
WEIGHT: 310 LBS | DIASTOLIC BLOOD PRESSURE: 72 MMHG | HEART RATE: 60 BPM | SYSTOLIC BLOOD PRESSURE: 130 MMHG | BODY MASS INDEX: 35.87 KG/M2 | HEIGHT: 78 IN

## 2024-09-20 DIAGNOSIS — Z90.5 HISTORY OF NEPHRECTOMY, LEFT: ICD-10-CM

## 2024-09-20 DIAGNOSIS — E11.29 TYPE 2 DIABETES MELLITUS WITH OTHER DIABETIC KIDNEY COMPLICATION: ICD-10-CM

## 2024-09-20 DIAGNOSIS — G47.33 OSA (OBSTRUCTIVE SLEEP APNEA): ICD-10-CM

## 2024-09-20 DIAGNOSIS — E78.5 HYPERLIPIDEMIA LDL GOAL <100: ICD-10-CM

## 2024-09-20 DIAGNOSIS — Z85.528 H/O RENAL CELL CANCER: ICD-10-CM

## 2024-09-20 DIAGNOSIS — I11.9 HYPERTENSIVE HEART DISEASE WITHOUT HEART FAILURE: ICD-10-CM

## 2024-09-20 DIAGNOSIS — E78.6 LOW HDL (UNDER 40): ICD-10-CM

## 2024-09-20 PROCEDURE — 1159F MED LIST DOCD IN RCRD: CPT | Performed by: INTERNAL MEDICINE

## 2024-09-20 PROCEDURE — 3074F SYST BP LT 130 MM HG: CPT | Performed by: INTERNAL MEDICINE

## 2024-09-20 PROCEDURE — 3050F LDL-C >= 130 MG/DL: CPT | Performed by: INTERNAL MEDICINE

## 2024-09-20 PROCEDURE — 4010F ACE/ARB THERAPY RXD/TAKEN: CPT | Performed by: INTERNAL MEDICINE

## 2024-09-20 PROCEDURE — 99214 OFFICE O/P EST MOD 30 MIN: CPT | Performed by: INTERNAL MEDICINE

## 2024-09-20 PROCEDURE — 3078F DIAST BP <80 MM HG: CPT | Performed by: INTERNAL MEDICINE

## 2024-09-20 PROCEDURE — 1036F TOBACCO NON-USER: CPT | Performed by: INTERNAL MEDICINE

## 2024-09-20 PROCEDURE — 3008F BODY MASS INDEX DOCD: CPT | Performed by: INTERNAL MEDICINE

## 2024-09-20 RX ORDER — ROSUVASTATIN CALCIUM 40 MG/1
40 TABLET, COATED ORAL
COMMUNITY
Start: 2024-08-26 | End: 2025-08-26

## 2024-09-20 NOTE — PATIENT INSTRUCTIONS
Continue same medications and treatments.     Please bring any lab results from other providers / physicians to your next appointment.     Please bring all medicines, vitamins, and herbal supplements with you when you come to the office.     Prescriptions will not be filled unless you are compliant with your follow up appointments or have a follow up appointment scheduled as per instruction of your physician. Refills should be requested at the time of your visit.      DID YOU KNOW:  We have a pharmacy here in the [facility].  They can fill all prescriptions, not just cardiac medications.  Prescriptions from other pharmacies can easily be transferred to the  pharmacy by the  pharmacist on site.   pharmacies offer FREE HOME DELIVERY on medications to anywhere in [place]. They can sync your medications. Typically prescriptions can be ready in 10 - 15 minutes. If pharmacy is unable to fill your  prescription or if cost is more than your paying now the Pharmacist can easily transfer back to your Pharmacy of choice. Pharmacy phone # [phone].   Continue same medications and treatments.     Please bring any lab results from other providers / physicians to your next appointment.     Please bring all medicines, vitamins, and herbal supplements with you when you come to the office.     Prescriptions will not be filled unless you are compliant with your follow up appointments or have a follow up appointment scheduled as per instruction of your physician. Refills should be requested at the time of your visit.      DID YOU KNOW:  We have a pharmacy here in the [facility].  They can fill all prescriptions, not just cardiac medications.  Prescriptions from other pharmacies can easily be transferred to the  pharmacy by the  pharmacist on site.   pharmacies offer FREE HOME DELIVERY on medications to anywhere in [place]. They can sync your medications. Typically prescriptions can be ready  in 10 - 15 minutes. If pharmacy is unable to fill your  prescription or if cost is more than your paying now the Pharmacist can easily transfer back to your Pharmacy of choice. Pharmacy phone # [phone].   Scribe Attestation  By signing my name below, ISuri LPN , [place]   attest that this documentation has been prepared under the direction and in the presence of Arsh Posey MD.

## 2024-09-20 NOTE — PROGRESS NOTES
CARDIOLOGY OFFICE NOTE     Date:   9/20/2024    Patient:    Brando Barertt    YOB: 1955    Primary Physician: Kristen Araiza MD       Reason for Visit: 6-month cardiology follow-up.    HPI:     Brando Barrett was seen in cardiac evaluation at the  Cardiology office September 20, 2024.      The patients problems are listed as in the impression below.    Electronic medical records reviewed.    Patient returns.  Feels well.  Has no new complaints.  Is relatively asymptomatic.    CT calcium scoring was elevated at 267 with ascending aorta measuring 40 mm.    Laboratory study was reviewed as noted below.    He otherwise is well without complaints.    Patient denies Chest Pain, SOB, Lightheadedness, Dizziness, TIA or CVA symptoms.  No CHF or Edema.  No Palpitations.  No GI,  or Bleeding Issues. No Recent Fever or Chills.     Cardiovascular and general review of systems is otherwise negative.    A 14-system review is otherwise negative, other than noted.     PHYSICAL EXAMINATION:      Vitals:    09/20/24 1028   BP: 130/72   Pulse: 60     General: No acute distress. Alert and oriented.  Head And Neck Examination: No jugular venous distention, no carotid bruits, no mass. Carotid upstrokes preserved. Oral mucosa moist. No xanthelasma. Head and neck examination otherwise unremarkable.  Lungs: Clear to auscultation and percussion. No wheezes, no rales, and no rhonchi.  Chest: Excursion appeared to be normal. No chest wall tenderness on palpation.  Heart: Normal S1 and S2. No S3. No S4. No rub. Grade 1/6 systolic murmur, best heard at the left sternal border. Point of maximal impulse was within normal limits.  Abdomen: Soft. Nontender. No organomegaly. No bruits. No masses. Obese.  Extremities: No bipedal edema. No clubbing. No cyanosis. Pulses are strong throughout. No bruits.  Musculoskeletal Exam: No ulcers, otherwise unremarkable.  Neuro: Neurologically appeared grossly intact.  .  IMPRESSION:        Cardiovascular status stable   Asymptomatic  Bradycardia  Abnormal rest electrocardiogram  Sick sinus syndrome, abnormal 7-day event monitor  PAF, prior history of atrial fibrillation  PSVT  PVCs frequent  Normal LV function ejection fraction 65 to 70%, echocardiogram 7/2023.  Left ventricular hypertrophy, mild  No valvular heart disease history  Negative Lexiscan perfusion study for MI or ischemia. LVEF 56%. 7/2023.  Hypertension  Hyperlipidemia  Diabetes  Abnormal rest electrocardiogram  Incomplete right bundle branch block  Poor wave enter progression question prior anterior septal MI  Obstructive sleep apnea on CPAP in the past, posterior septal nasal surgery  Renal cell carcinoma status post left nephrectomy 1991  Prior hernia or surgery  Elevated liver function studies  Renal insufficiency  Otherwise as per assessment below.    RECOMMENDATIONS:      Patient overall is doing well.  He was reassured.  Would suggest continuing his current medications.  Refills were provided.    Exercise dietary program.    Hydration.    [employer] portal use was encouraged.    We will plan to see back in 3 months with Laboratory Studies and ECG as ordered.     Patient will follow up with their primary physician for general care.    The patient knows to contact medical care earlier if need be.      ALLERGIES:     Allergies   Allergen Reactions    Codeine Other    Magnesium Oxide Diarrhea    Procaine Other     Other Reaction(s): tachycardia    Morphine Other and Nausea And Vomiting     nausea    Other Reaction(s): GI Upset        MEDICATIONS:     Current Outpatient Medications   Medication Instructions    fluticasone (Flonase) 50 mcg/actuation nasal spray 1 spray, Each Nostril, 2 times daily, Shake gently. Before first use, prime pump. After use, clean tip and replace cap.    FreeStyle Demetria 2 Sensor kit subcutaneous, Every 14 days    lisinopril 5 mg, oral, Daily    Ozempic 0.5 mg, subcutaneous, Once Weekly    rosuvastatin (CRESTOR)  40 mg, oral, Daily RT    Tresiba FlexTouch U-100 4 Units, subcutaneous, Daily PRN       ELECTROCARDIOGRAM:      None this visit    CARDIAC TESTING:      CT calcium score, 4/2024:  Score of 267.  Ascending aortic dilatation, 40 mm.    LABORATORY DATA:      CBC:   Lab Results   Component Value Date    WBC 5.1 09/16/2024    RBC 5.02 09/16/2024    HGB 15.1 09/16/2024    HCT 44.5 09/16/2024     09/16/2024        CMP:    Lab Results   Component Value Date     09/16/2024    K 4.3 09/16/2024     09/16/2024    CO2 27 09/16/2024    BUN 13 09/16/2024    CREATININE 1.37 (H) 09/16/2024    GLUCOSE 102 (H) 09/16/2024    CALCIUM 9.3 09/16/2024       Magnesium:    Lab Results   Component Value Date    MG 1.79 09/16/2024     Hepatic Function Panel:    Lab Results   Component Value Date    ALKPHOS 65 09/16/2024    ALT 19 09/16/2024    AST 21 09/16/2024    PROT 7.3 09/16/2024    BILITOT 0.7 09/16/2024    BILIDIR 0.2 09/16/2024       TSH:    Lab Results   Component Value Date    TSH 3.52 09/16/2024       HgBA1c:    Lab Results   Component Value Date    HGBA1C 5.7 08/23/2024                       PROBLEM LIST:     Patient Active Problem List   Diagnosis    Diabetic polyneuropathy associated with type 2 diabetes mellitus (Multi)    Dysfunction of right eustachian tube    Facial pain    H/O renal cell cancer    History of nephrectomy, left    Hyperlipidemia LDL goal <100    Hypertensive heart disease without heart failure    Hyperuricemia    Low HDL (under 40)    Mixed hearing loss of right ear    Nasal obstruction    RUTH (obstructive sleep apnea)    Pulsatile tinnitus    Type 2 diabetes mellitus with other diabetic kidney complication (Multi)             Arsh Posey MD, FACC   [facility] / NO /  Cardiology      Of Note:  [employer] voice recognition dictation software was utilized partially in the preparation of this note, therefore, inaccuracies in spelling, word choice and punctuation may have occurred which were  not recognized the time of signing.    Patient was seen and examined with total time of visit including chart preparation, rooming, and chart completion exceeding 40 minutes.      ----

## 2024-10-31 ENCOUNTER — HOSPITAL ENCOUNTER (OUTPATIENT)
Dept: LAB | Age: 69
Discharge: HOME | End: 2024-10-31
Payer: MEDICARE

## 2024-10-31 LAB
CRP SERPL HS-MCNC: 4.3 MG/L (ref 0–5)
ERYTHROCYTE [SEDIMENTATION RATE] IN BLOOD BY WESTERGREN METHOD: 6 MM (ref 0–20)

## 2024-10-31 PROCEDURE — 85652 RBC SED RATE AUTOMATED: CPT

## 2024-10-31 PROCEDURE — 86140 C-REACTIVE PROTEIN: CPT

## 2024-10-31 PROCEDURE — 86617 LYME DISEASE ANTIBODY: CPT

## 2024-11-03 LAB
B BURGDOR IGG SER QL IB: NEGATIVE
B BURGDOR IGM SER QL IB: NEGATIVE

## 2024-11-21 ENCOUNTER — HOSPITAL ENCOUNTER (OUTPATIENT)
Dept: MRI IMAGING | Age: 69
Discharge: HOME | End: 2024-11-23
Payer: MEDICARE

## 2024-11-21 DIAGNOSIS — M54.10 RADICULITIS: ICD-10-CM

## 2024-11-21 DIAGNOSIS — M54.9 DORSALGIA: ICD-10-CM

## 2024-11-21 DIAGNOSIS — G37.3 ACUTE TRANSVERSE MYELITIS IN DEMYELINATING DISEASE OF CENTRAL NERVOUS SYSTEM (HCC): ICD-10-CM

## 2024-11-21 PROCEDURE — 72148 MRI LUMBAR SPINE W/O DYE: CPT

## 2024-11-21 PROCEDURE — 72195 MRI PELVIS W/O DYE: CPT

## 2024-12-24 ENCOUNTER — OFFICE VISIT (OUTPATIENT)
Dept: ORTHOPEDIC SURGERY | Facility: CLINIC | Age: 69
End: 2024-12-24
Payer: MEDICARE

## 2024-12-24 ENCOUNTER — HOSPITAL ENCOUNTER (OUTPATIENT)
Dept: RADIOLOGY | Facility: CLINIC | Age: 69
Discharge: HOME | End: 2024-12-24
Payer: MEDICARE

## 2024-12-24 VITALS — WEIGHT: 300 LBS | HEIGHT: 78 IN | BODY MASS INDEX: 34.71 KG/M2

## 2024-12-24 DIAGNOSIS — M48.062 SPINAL STENOSIS OF LUMBAR REGION WITH NEUROGENIC CLAUDICATION: Primary | ICD-10-CM

## 2024-12-24 DIAGNOSIS — M54.50 LUMBAR PAIN: ICD-10-CM

## 2024-12-24 PROCEDURE — 99205 OFFICE O/P NEW HI 60 MIN: CPT | Performed by: ORTHOPAEDIC SURGERY

## 2024-12-24 PROCEDURE — 99215 OFFICE O/P EST HI 40 MIN: CPT | Performed by: ORTHOPAEDIC SURGERY

## 2024-12-24 PROCEDURE — 1159F MED LIST DOCD IN RCRD: CPT | Performed by: ORTHOPAEDIC SURGERY

## 2024-12-24 PROCEDURE — 3050F LDL-C >= 130 MG/DL: CPT | Performed by: ORTHOPAEDIC SURGERY

## 2024-12-24 PROCEDURE — 72110 X-RAY EXAM L-2 SPINE 4/>VWS: CPT | Performed by: ORTHOPAEDIC SURGERY

## 2024-12-24 PROCEDURE — 3008F BODY MASS INDEX DOCD: CPT | Performed by: ORTHOPAEDIC SURGERY

## 2024-12-24 PROCEDURE — 72120 X-RAY BEND ONLY L-S SPINE: CPT

## 2024-12-24 PROCEDURE — 4010F ACE/ARB THERAPY RXD/TAKEN: CPT | Performed by: ORTHOPAEDIC SURGERY

## 2024-12-24 PROCEDURE — 1125F AMNT PAIN NOTED PAIN PRSNT: CPT | Performed by: ORTHOPAEDIC SURGERY

## 2024-12-24 PROCEDURE — 1036F TOBACCO NON-USER: CPT | Performed by: ORTHOPAEDIC SURGERY

## 2024-12-24 ASSESSMENT — PAIN - FUNCTIONAL ASSESSMENT: PAIN_FUNCTIONAL_ASSESSMENT: 0-10

## 2024-12-24 ASSESSMENT — PAIN SCALES - GENERAL: PAINLEVEL_OUTOF10: 1

## 2024-12-24 NOTE — PROGRESS NOTES
Brando Barrett is a 69 y.o. male who presents for New Patient Visit of the Lower Back (Bilat leg weakness/X-rays today/MRI done at [facility]).    HPI:  69-year-old gentleman here for new patient evaluation of low back pain bilateral leg weakness.  MRI done at [facility].  He denies any fever chills nausea vomiting night sweats.  He has no bowel or bladder complaints.    Physical exam:  Well-nourished, well kept.No lymphangitis or lymphadenopathy in the examined extremities.  Gait normal.  Can stand on heels and toes.   Examination of the back shows some tenderness in the paraspinous musculature.  There is decreased range of motion in all directions due to guarding/muscle spasms and pain at extremes.  There is good strength and no instability.  Examination of the lower extremities reveals no point tenderness, swelling, or deformity.  Range of motion of the hips, knees, and ankles are full without crepitance, instability, or exacerbation of pain.  Strength is 5/5 throughout.  No redness, abrasions, or lesions on extremities  Gross sensation intact in the extremities.  Deep tendon reflexes 1+ bilateral. Clonus negative.  Affect normal.  Alert and oriented ×3.  Coordination normal.  Pedal pulses are weak, feet are warm and pink to the touch capillary refill bilaterally slow.    Imaging studies:  AP lateral flexion-extension plain films of the lumbar spine were ordered and reviewed today.  An MRI from [facility] from November 21, 2024 was reviewed today.    Assessment:  69-year-old gentleman here for new patient evaluation for low back pain and claudication symptoms.  He has had back pain for a couple of years that he has been dealing with.  About 3 months ago he started to develop increasing numbness in his legs left greater than right.  The symptoms are progressing, he has shortened walking distance, a shopping cart helps.  I do not get any weakness in his lower extremity on physical exam.  Overall its the legs that  bother him the most, he is having more and more difficulty walking.  The back pain is something he has been dealing with for a while.  He did physical therapy for his back pain a couple of years ago with some relief.  He has not done anything to treat the new onset of leg numbness at the time of this visit.  No history of back surgery.  He is describing some intermittent perianal numbness, but he denies any full saddle anesthesia, urinary or bowel incontinence.  His MRI from [facility] shows multiple level degenerative changes, at L3-4 he has severe central stenosis.  He is diabetic, recent hemoglobin A1c from August 2024 is 5.7.  He has surgical clips present on x-ray on the left, this was from a nephrectomy 34 years ago for renal cancer.  No history of radiation treatment.    We have ordered and reviewed tests, x-rays, MRI.  Labs from August 2024 were reviewed, hemoglobin A1c 5.7.  We reviewed the internal medicine primary care notes from Dr. Kristen Araiza from October 31, 2024 this does mention his leg and back symptoms.  This is a patient with 2 problems, chronic exacerbation of low back pain, new undiagnosed problem with uncertain prognosis of severe central spinal stenosis.  This is severely inhibiting his bodily function.    For complete plan and/or surgical details, please refer to Dr. Godinez's portion of this split dictation.    -Ottoniel Mercado PA-C    In a face-to-face encounter, I performed a history and physical examination, discussed pertinent diagnostic studies if indicated, and discussed diagnosis and management strategies with both the patient and the midlevel provider.  I reviewed the midlevel's note and agree with the documented findings and plan of care.    Patient with leg symptoms for the past few months.  They are getting progressively worse.  He cannot stand or walk for any prolonged period of time.  He first noticed it in a grocery store and then also noticed that at a wedding  where he could not stand because his leg started getting numb and tingly and weak.  He has no fevers chills nausea vomiting.  No bowel or bladder changes.  He has tried physical therapy with no success.  This is progressing.  He has no history of spine surgery.  He says he cannot live like this anymore.  He is severely inhibited with bodily function.  He notices that when he sits and leans forward it helps or when he rests it helps.  He does have diabetes but his A1c is in the fives and well-controlled without insulin.  He has no history of heart issues or vascular issues.    On exam I get palpable pulses bilaterally with dorsalis pedis which are weak but palpable.  His toes have brisk capillary refill.  His back skin is clear.  He is tender in his back.  He walks normally for short distances.  He has good strength bilateral extremities.  X-rays show moderate to severe degenerative changes at all levels with some loss of lordosis.  MRI was reviewed from [facility].  He has moderate central stenosis at L2-3 and severe central stenosis at L3-4.  I do not see any real significant stenosis anywhere else.    Assessment/plan: Patient with above described symptoms consistent with neurogenic claudication.  I offered for him to see a vascular surgeon to workup his vascular system but he says he is not interested.  It is very unlikely that that is contributing to his symptoms but could be mildly.  I do think his back is the main contributing factor.  I had a long discussion with the patient and explained to them that their options are 1) live with the symptoms and see how they evolve, 2) physical therapy, 3) pain management or 4) surgery.    He has no interest in any more physical therapy as he is failed that.  He cannot live with it.  And he is not interested in pain management.  All of the risks, benefits, and potential complications for operative and nonoperative treatment were discussed at length with the patient.  The patient  understands that surgery is elective.  The patient understands that I do not have to do surgery.  The patient understands that surgery comes with more risks than not doing surgery.  Risks of operative intervention include, but are not limited to: Anesthesia complications, excessive blood loss, infection, damaged to uninjured structures including, but not limited to, the dural sac and nerve roots, blood clots, and lack of improvement or worsening of symptoms.  These complications could result in death, permanent disability, or need for reoperation.  The patient completely understood those risks and wished to proceed with operative intervention.    We are going to perform an L2-3 and L3-4 midline laminectomy.  I do not see any need for any fusion or instrumentation.  He does have some loss of lordosis but I do not think a large thoraco lumbar fusion would be beneficial in him given his degenerative changes.  His back pain is not a predominant symptom.  He understands a significant increased risk of durotomy at the L3-4 level giving that there is a cyst there and severe stenosis there.    The patient has been prescribed a lumbar LSO back brace.  The orthosis is required to reduce pain by restricting mobility of the trunk.  The patient is to wear the prescribed item as instructed for all activities of daily living unless otherwise specified in my notes.  The patient may also remove for normal hygiene and sleep unless the device is in place for a fracture or other injury requiring 24-hour stabilization.    Dev Godinez MD  Orthopedic surgery

## 2024-12-26 ENCOUNTER — TELEPHONE (OUTPATIENT)
Dept: ORTHOPEDIC SURGERY | Facility: CLINIC | Age: 69
End: 2024-12-26
Payer: MEDICARE

## 2025-01-08 ENCOUNTER — LAB (OUTPATIENT)
Dept: LAB | Facility: LAB | Age: 70
End: 2025-01-08
Payer: MEDICARE

## 2025-01-08 DIAGNOSIS — I11.9 HYPERTENSIVE HEART DISEASE WITHOUT HEART FAILURE: ICD-10-CM

## 2025-01-08 DIAGNOSIS — G47.33 OSA (OBSTRUCTIVE SLEEP APNEA): ICD-10-CM

## 2025-01-08 DIAGNOSIS — Z90.5 HISTORY OF NEPHRECTOMY, LEFT: ICD-10-CM

## 2025-01-08 DIAGNOSIS — E78.5 HYPERLIPIDEMIA LDL GOAL <100: ICD-10-CM

## 2025-01-08 DIAGNOSIS — E78.6 LOW HDL (UNDER 40): ICD-10-CM

## 2025-01-08 DIAGNOSIS — Z85.528 H/O RENAL CELL CANCER: ICD-10-CM

## 2025-01-08 DIAGNOSIS — E11.29 TYPE 2 DIABETES MELLITUS WITH OTHER DIABETIC KIDNEY COMPLICATION: ICD-10-CM

## 2025-01-08 LAB
ALBUMIN SERPL BCP-MCNC: 4.2 G/DL (ref 3.4–5)
ALP SERPL-CCNC: 61 U/L (ref 33–136)
ALT SERPL W P-5'-P-CCNC: 24 U/L (ref 10–52)
ANION GAP SERPL CALC-SCNC: 9 MMOL/L (ref 10–20)
AST SERPL W P-5'-P-CCNC: 23 U/L (ref 9–39)
BILIRUB DIRECT SERPL-MCNC: 0.2 MG/DL (ref 0–0.3)
BILIRUB SERPL-MCNC: 0.8 MG/DL (ref 0–1.2)
BUN SERPL-MCNC: 20 MG/DL (ref 6–23)
CALCIUM SERPL-MCNC: 9.4 MG/DL (ref 8.6–10.3)
CHLORIDE SERPL-SCNC: 104 MMOL/L (ref 98–107)
CHOLEST SERPL-MCNC: 136 MG/DL (ref 0–199)
CHOLESTEROL/HDL RATIO: 4.3
CO2 SERPL-SCNC: 28 MMOL/L (ref 21–32)
CREAT SERPL-MCNC: 1.33 MG/DL (ref 0.5–1.3)
EGFRCR SERPLBLD CKD-EPI 2021: 58 ML/MIN/1.73M*2
ERYTHROCYTE [DISTWIDTH] IN BLOOD BY AUTOMATED COUNT: 13.3 % (ref 11.5–14.5)
GLUCOSE SERPL-MCNC: 100 MG/DL (ref 74–99)
HCT VFR BLD AUTO: 43.2 % (ref 41–52)
HDLC SERPL-MCNC: 31.8 MG/DL
HGB BLD-MCNC: 14.1 G/DL (ref 13.5–17.5)
LDLC SERPL CALC-MCNC: 78 MG/DL
MAGNESIUM SERPL-MCNC: 1.78 MG/DL (ref 1.6–2.4)
MCH RBC QN AUTO: 29.9 PG (ref 26–34)
MCHC RBC AUTO-ENTMCNC: 32.6 G/DL (ref 32–36)
MCV RBC AUTO: 92 FL (ref 80–100)
NON HDL CHOLESTEROL: 104 MG/DL (ref 0–149)
NRBC BLD-RTO: 0 /100 WBCS (ref 0–0)
PLATELET # BLD AUTO: 126 X10*3/UL (ref 150–450)
POTASSIUM SERPL-SCNC: 4.7 MMOL/L (ref 3.5–5.3)
PROT SERPL-MCNC: 7 G/DL (ref 6.4–8.2)
RBC # BLD AUTO: 4.71 X10*6/UL (ref 4.5–5.9)
SODIUM SERPL-SCNC: 136 MMOL/L (ref 136–145)
TRIGL SERPL-MCNC: 133 MG/DL (ref 0–149)
TSH SERPL-ACNC: 2.87 MIU/L (ref 0.44–3.98)
VLDL: 27 MG/DL (ref 0–40)
WBC # BLD AUTO: 4.9 X10*3/UL (ref 4.4–11.3)

## 2025-01-08 PROCEDURE — 82248 BILIRUBIN DIRECT: CPT

## 2025-01-08 PROCEDURE — 80053 COMPREHEN METABOLIC PANEL: CPT

## 2025-01-08 PROCEDURE — 83036 HEMOGLOBIN GLYCOSYLATED A1C: CPT

## 2025-01-08 PROCEDURE — 80061 LIPID PANEL: CPT

## 2025-01-08 PROCEDURE — 84443 ASSAY THYROID STIM HORMONE: CPT

## 2025-01-08 PROCEDURE — 83735 ASSAY OF MAGNESIUM: CPT

## 2025-01-08 PROCEDURE — 85027 COMPLETE CBC AUTOMATED: CPT

## 2025-01-09 LAB
EST. AVERAGE GLUCOSE BLD GHB EST-MCNC: 140 MG/DL
HBA1C MFR BLD: 6.5 %

## 2025-01-10 ENCOUNTER — APPOINTMENT (OUTPATIENT)
Dept: CARDIOLOGY | Facility: CLINIC | Age: 70
End: 2025-01-10
Payer: MEDICARE

## 2025-01-10 VITALS
BODY MASS INDEX: 36.45 KG/M2 | WEIGHT: 315 LBS | SYSTOLIC BLOOD PRESSURE: 124 MMHG | HEART RATE: 64 BPM | HEIGHT: 78 IN | DIASTOLIC BLOOD PRESSURE: 80 MMHG

## 2025-01-10 DIAGNOSIS — E78.6 LOW HDL (UNDER 40): ICD-10-CM

## 2025-01-10 DIAGNOSIS — Z01.818 PRE-OPERATIVE CLEARANCE: Primary | ICD-10-CM

## 2025-01-10 DIAGNOSIS — E78.5 HYPERLIPIDEMIA LDL GOAL <100: ICD-10-CM

## 2025-01-10 DIAGNOSIS — G47.33 OSA (OBSTRUCTIVE SLEEP APNEA): ICD-10-CM

## 2025-01-10 DIAGNOSIS — I11.9 HYPERTENSIVE HEART DISEASE WITHOUT HEART FAILURE: ICD-10-CM

## 2025-01-10 DIAGNOSIS — E11.29 TYPE 2 DIABETES MELLITUS WITH OTHER DIABETIC KIDNEY COMPLICATION: ICD-10-CM

## 2025-01-10 DIAGNOSIS — Z90.5 HISTORY OF NEPHRECTOMY, LEFT: ICD-10-CM

## 2025-01-10 DIAGNOSIS — Z85.528 H/O RENAL CELL CANCER: ICD-10-CM

## 2025-01-10 PROCEDURE — 99215 OFFICE O/P EST HI 40 MIN: CPT | Performed by: INTERNAL MEDICINE

## 2025-01-10 PROCEDURE — 4010F ACE/ARB THERAPY RXD/TAKEN: CPT | Performed by: INTERNAL MEDICINE

## 2025-01-10 PROCEDURE — 3074F SYST BP LT 130 MM HG: CPT | Performed by: INTERNAL MEDICINE

## 2025-01-10 PROCEDURE — 3048F LDL-C <100 MG/DL: CPT | Performed by: INTERNAL MEDICINE

## 2025-01-10 PROCEDURE — 3079F DIAST BP 80-89 MM HG: CPT | Performed by: INTERNAL MEDICINE

## 2025-01-10 PROCEDURE — 3044F HG A1C LEVEL LT 7.0%: CPT | Performed by: INTERNAL MEDICINE

## 2025-01-10 PROCEDURE — 3008F BODY MASS INDEX DOCD: CPT | Performed by: INTERNAL MEDICINE

## 2025-01-10 PROCEDURE — 1159F MED LIST DOCD IN RCRD: CPT | Performed by: INTERNAL MEDICINE

## 2025-01-10 RX ORDER — BLOOD-GLUCOSE SENSOR
EACH MISCELLANEOUS
COMMUNITY

## 2025-01-10 NOTE — PATIENT INSTRUCTIONS
Follow up 6 months  Fasting labs to be done approximately 1 week prior to next appointment. We are a paperless office.  The order is in the computer and you can go to any  lab to have done. You can always ask for order to be printed if you'd like to go to outside lab.      DID YOU KNOW  We have a pharmacy here in the [facility].  They can fill all prescriptions, not just cardiac medications.  Prescriptions from other pharmacies can easily be transferred to the  pharmacy by the  pharmacist on site.   pharmacies offer FREE HOME DELIVERY on medications to anywhere in [place]. They can sync your medications. Typically prescriptions can be ready in 10 - 15 minutes. If pharmacy is unable to fill your  prescription or if cost is more than your paying now the Pharmacist can easily transfer back to your Pharmacy of choice. Pharmacy phone # [phone].       Please bring all medicines, vitamins, and herbal supplements with you in original bottles to every appointment!!!!    Prescriptions will not be filled unless you are compliant with your follow up appointments or have a follow up appointment scheduled as per instruction of your physician. Refills should be requested at the time of your visit.

## 2025-01-10 NOTE — PROGRESS NOTES
CARDIOLOGY OFFICE NOTE     Date:   1/10/2025    Patient:    Brando Barrett    YOB: 1955    Primary Physician: Kristen Araiza MD       Reason for Visit: Planned laminectomy, cardiac assessment requested.     HPI:     Brando Barrett was seen in cardiac evaluation at the  Cardiology office January 10, 2025.      The patients problems are listed as in the impression below.    Electronic medical records reviewed.    Patient returns.  From a cardiovascular standpoint he states that he is well.  He is active overall.  He is limited due to chronic lower back pain and bilateral lower extremity radicular claudication.    He is scheduled for decompression laminectomy with Dr. Godinez February 7, 2025.    He states that he is able to climb a flight of stairs carrying objects without cardiovascular complaints.  No chest pain or shortness of breath.    He has had no changes in his cardiovascular status otherwise.    He has no cardiovascular complaints.    Patient denies Chest Pain, SOB, Lightheadedness, Dizziness, TIA or CVA symptoms.  No CHF or Edema.  No Palpitations.  No GI,  or Bleeding Issues. No Recent Fever or Chills.     Cardiovascular and general review of systems is otherwise negative.    A 14-system review is otherwise negative, other than noted.     PHYSICAL EXAMINATION:      Vitals:    01/10/25 1049   BP: 124/80   Pulse: 64     General: No acute distress. Alert and oriented.  Head And Neck Examination: No jugular venous distention, no carotid bruits, no mass. Carotid upstrokes preserved. Oral mucosa moist. No xanthelasma. Head and neck examination otherwise unremarkable.  Lungs: Clear to auscultation and percussion. No wheezes, no rales, and no rhonchi.  Chest: Excursion appeared to be normal. No chest wall tenderness on palpation.  Heart: Normal S1 and S2. No S3. No S4. No rub. Grade 1/6 systolic murmur, best heard at the left sternal border. Point of maximal impulse was within normal  limits.  Abdomen: Soft. Nontender. No organomegaly. No bruits. No masses. Obese.  Extremities: No bipedal edema. No clubbing. No cyanosis. Pulses are strong throughout. No bruits.  Musculoskeletal Exam: No ulcers, otherwise unremarkable.  Neuro: Neurologically appeared grossly intact.  .  IMPRESSION:       Preoperative cardiac assessment, acceptable cardiovascular risk for planned surgery.  Radicular claudication, planned lumbar decompression laminectomy, 2/2025.  Cardiovascular status stable   Asymptomatic  Bradycardia  Abnormal rest electrocardiogram  Sick sinus syndrome, abnormal 7-day event monitor  PAF, prior history of atrial fibrillation  PSVT  PVCs frequent  Normal LV function ejection fraction 65 to 70%, echocardiogram 7/2023.  Left ventricular hypertrophy, mild  No valvular heart disease history  Negative Lexiscan perfusion study for MI or ischemia. LVEF 56%. 7/2023.  Hypertension  Hyperlipidemia  Diabetes  Abnormal rest electrocardiogram  Incomplete right bundle branch block  Poor wave enter progression question prior anterior septal MI  Obstructive sleep apnea on CPAP in the past, posterior septal nasal surgery  Renal cell carcinoma status post left nephrectomy 1991  Prior hernia or surgery  Elevated liver function studies  Renal insufficiency  Otherwise as per assessment below.    RECOMMENDATIONS:      Patient overall is doing well from a cardiovascular standpoint.  He is able to climb a flight of steps without symptoms suggesting that he is at least able to do 4 METS of activity without limitations.  He has no new complaints.  He therefore should be acceptable cardiovascular risk for his planned surgery.    He will continue his current medications.  Refills were provided.    Exercise dietary program as tolerated.  Hydration.    MyChart portal use was encouraged.    We will plan to see back in 6 months with Laboratory Studies and ECG as ordered.     Patient will follow up with their primary physician  for general care.    The patient knows to contact medical care earlier if need be.      ALLERGIES:     Allergies   Allergen Reactions    Codeine Other    Magnesium Oxide Diarrhea    Procaine Other     Other Reaction(s): tachycardia    Morphine Other and Nausea And Vomiting     nausea    Other Reaction(s): GI Upset        MEDICATIONS:     Current Outpatient Medications   Medication Instructions    blood-glucose sensor (FreeStyle Demetria 3 Plus Sensor) device No dose, route, or frequency recorded.    fluticasone (Flonase) 50 mcg/actuation nasal spray 1 spray, 2 times daily    lisinopril 5 mg, Daily    Ozempic 0.5 mg, Once Weekly    rosuvastatin (CRESTOR) 40 mg, Daily RT    Tresiba FlexTouch U-100 4 Units, Daily PRN       ELECTROCARDIOGRAM:      None this visit    CARDIAC TESTING:      None this visit    LABORATORY DATA:      CBC:   Lab Results   Component Value Date    WBC 4.9 01/08/2025    RBC 4.71 01/08/2025    HGB 14.1 01/08/2025    HCT 43.2 01/08/2025     (L) 01/08/2025        CMP:    Lab Results   Component Value Date     01/08/2025    K 4.7 01/08/2025     01/08/2025    CO2 28 01/08/2025    BUN 20 01/08/2025    CREATININE 1.33 (H) 01/08/2025    GLUCOSE 100 (H) 01/08/2025    CALCIUM 9.4 01/08/2025       Magnesium:    Lab Results   Component Value Date    MG 1.78 01/08/2025       Lipid Profile:    Lab Results   Component Value Date    CHOL 136 01/08/2025    TRIG 133 01/08/2025    HDL 31.8 01/08/2025 1/2025: LDL 78.    Hepatic Function Panel:    Lab Results   Component Value Date    ALKPHOS 61 01/08/2025    ALT 24 01/08/2025    AST 23 01/08/2025    PROT 7.0 01/08/2025    BILITOT 0.8 01/08/2025    BILIDIR 0.2 01/08/2025       TSH:    Lab Results   Component Value Date    TSH 2.87 01/08/2025       HgBA1c:    Lab Results   Component Value Date    HGBA1C 6.5 (H) 01/08/2025                 PROBLEM LIST:     Patient Active Problem List   Diagnosis    Diabetic polyneuropathy associated with type 2  diabetes mellitus (Multi)    Dysfunction of right eustachian tube    Facial pain    H/O renal cell cancer    History of nephrectomy, left    Hyperlipidemia LDL goal <100    Hypertensive heart disease without heart failure    Hyperuricemia    Low HDL (under 40)    Mixed hearing loss of right ear    Nasal obstruction    RUTH (obstructive sleep apnea)    Pulsatile tinnitus    Type 2 diabetes mellitus with other diabetic kidney complication             Arsh Posey MD, [facility] /  Cardiology      Of Note:  [employer] voice recognition dictation software was utilized partially in the preparation of this note, therefore, inaccuracies in spelling, word choice and punctuation may have occurred which were not recognized the time of signing.    Patient was seen and examined with total time of visit including chart preparation, rooming, and chart completion exceeding 40 minutes.      ----

## 2025-01-16 PROBLEM — M48.061 SPINAL STENOSIS, LUMBAR REGION WITHOUT NEUROGENIC CLAUDICATION: Status: ACTIVE | Noted: 2025-03-05

## 2025-02-18 ENCOUNTER — LAB (OUTPATIENT)
Dept: LAB | Facility: HOSPITAL | Age: 70
End: 2025-02-18
Payer: MEDICARE

## 2025-02-18 DIAGNOSIS — Z01.818 PRE-OP TESTING: ICD-10-CM

## 2025-02-18 LAB
ALBUMIN SERPL BCP-MCNC: 4.1 G/DL (ref 3.4–5)
ALP SERPL-CCNC: 56 U/L (ref 33–136)
ALT SERPL W P-5'-P-CCNC: 24 U/L (ref 10–52)
ANION GAP SERPL CALC-SCNC: 11 MMOL/L (ref 10–20)
AST SERPL W P-5'-P-CCNC: 25 U/L (ref 9–39)
BASOPHILS # BLD AUTO: 0.03 X10*3/UL (ref 0–0.1)
BASOPHILS NFR BLD AUTO: 0.7 %
BILIRUB SERPL-MCNC: 0.6 MG/DL (ref 0–1.2)
BUN SERPL-MCNC: 23 MG/DL (ref 6–23)
CALCIUM SERPL-MCNC: 9.5 MG/DL (ref 8.6–10.3)
CHLORIDE SERPL-SCNC: 102 MMOL/L (ref 98–107)
CO2 SERPL-SCNC: 27 MMOL/L (ref 21–32)
CREAT SERPL-MCNC: 1.37 MG/DL (ref 0.5–1.3)
EGFRCR SERPLBLD CKD-EPI 2021: 56 ML/MIN/1.73M*2
EOSINOPHIL # BLD AUTO: 0.2 X10*3/UL (ref 0–0.7)
EOSINOPHIL NFR BLD AUTO: 4.6 %
ERYTHROCYTE [DISTWIDTH] IN BLOOD BY AUTOMATED COUNT: 12.5 % (ref 11.5–14.5)
GLUCOSE SERPL-MCNC: 122 MG/DL (ref 74–99)
HCT VFR BLD AUTO: 43.4 % (ref 41–52)
HGB BLD-MCNC: 14.2 G/DL (ref 13.5–17.5)
IMM GRANULOCYTES # BLD AUTO: 0.01 X10*3/UL (ref 0–0.7)
IMM GRANULOCYTES NFR BLD AUTO: 0.2 % (ref 0–0.9)
INR PPP: 1.1 (ref 0.9–1.1)
LYMPHOCYTES # BLD AUTO: 1.4 X10*3/UL (ref 1.2–4.8)
LYMPHOCYTES NFR BLD AUTO: 31.9 %
MCH RBC QN AUTO: 30 PG (ref 26–34)
MCHC RBC AUTO-ENTMCNC: 32.7 G/DL (ref 32–36)
MCV RBC AUTO: 92 FL (ref 80–100)
MONOCYTES # BLD AUTO: 0.34 X10*3/UL (ref 0.1–1)
MONOCYTES NFR BLD AUTO: 7.7 %
NEUTROPHILS # BLD AUTO: 2.41 X10*3/UL (ref 1.2–7.7)
NEUTROPHILS NFR BLD AUTO: 54.9 %
NRBC BLD-RTO: 0 /100 WBCS (ref 0–0)
PLATELET # BLD AUTO: 146 X10*3/UL (ref 150–450)
POTASSIUM SERPL-SCNC: 4.2 MMOL/L (ref 3.5–5.3)
PROT SERPL-MCNC: 7 G/DL (ref 6.4–8.2)
PROTHROMBIN TIME: 12.8 SECONDS (ref 9.8–12.8)
RBC # BLD AUTO: 4.74 X10*6/UL (ref 4.5–5.9)
SODIUM SERPL-SCNC: 136 MMOL/L (ref 136–145)
WBC # BLD AUTO: 4.4 X10*3/UL (ref 4.4–11.3)

## 2025-02-18 PROCEDURE — 85610 PROTHROMBIN TIME: CPT

## 2025-02-18 PROCEDURE — 85025 COMPLETE CBC W/AUTO DIFF WBC: CPT

## 2025-02-18 PROCEDURE — 80053 COMPREHEN METABOLIC PANEL: CPT

## 2025-02-20 ENCOUNTER — ANCILLARY PROCEDURE (OUTPATIENT)
Dept: CARDIOLOGY | Facility: CLINIC | Age: 70
End: 2025-02-20
Payer: MEDICARE

## 2025-02-20 VITALS — HEART RATE: 58 BPM

## 2025-02-20 DIAGNOSIS — Z01.818 PRE-OP TESTING: ICD-10-CM

## 2025-02-20 PROCEDURE — 93000 ELECTROCARDIOGRAM COMPLETE: CPT | Performed by: INTERNAL MEDICINE

## 2025-02-20 NOTE — PROGRESS NOTES
EKG ordered by : Ottoniel Mercado      For: POC (pre-op)  EKG read in office by: Dr. Cabrera   EKG scanned into chart and routed to reading physician.

## 2025-02-25 ENCOUNTER — OFFICE VISIT (OUTPATIENT)
Dept: ORTHOPEDIC SURGERY | Facility: CLINIC | Age: 70
End: 2025-02-25
Payer: MEDICARE

## 2025-02-25 DIAGNOSIS — M48.061 SPINAL STENOSIS OF LUMBAR REGION, UNSPECIFIED WHETHER NEUROGENIC CLAUDICATION PRESENT: Primary | ICD-10-CM

## 2025-02-25 PROCEDURE — 3048F LDL-C <100 MG/DL: CPT | Performed by: PHYSICIAN ASSISTANT

## 2025-02-25 PROCEDURE — 4010F ACE/ARB THERAPY RXD/TAKEN: CPT | Performed by: PHYSICIAN ASSISTANT

## 2025-02-25 PROCEDURE — 1159F MED LIST DOCD IN RCRD: CPT | Performed by: PHYSICIAN ASSISTANT

## 2025-02-25 PROCEDURE — 3044F HG A1C LEVEL LT 7.0%: CPT | Performed by: PHYSICIAN ASSISTANT

## 2025-02-25 PROCEDURE — 1036F TOBACCO NON-USER: CPT | Performed by: PHYSICIAN ASSISTANT

## 2025-02-25 PROCEDURE — 99211 OFF/OP EST MAY X REQ PHY/QHP: CPT | Performed by: ORTHOPAEDIC SURGERY

## 2025-02-25 PROCEDURE — 99024 POSTOP FOLLOW-UP VISIT: CPT | Performed by: PHYSICIAN ASSISTANT

## 2025-02-25 NOTE — PROGRESS NOTES
Brando Barrett is a 69 y.o. male who presents for Pre-op Visit of the Lower Back (L2-3, L3-4  [facility] for 3/5/25 at [facility]).    HPI:  69-year-old gentleman here for preop visit.  He is scheduled undergo an L2-3 L3-4 laminectomy.  He denies any fever chills nausea vomiting night sweats.  He has no bowel or bladder complaints.    Physical exam:  Well-nourished, well kept.No lymphangitis or lymphadenopathy in the examined extremities.  Gait normal.  Can stand on heels and toes.   Examination of the back shows tenderness in the paraspinous musculature.  There is decreased range of motion in all directions due to guarding/muscle spasms and pain at extremes.  There is good strength and no instability.  Examination of the lower extremities reveals no point tenderness, swelling, or deformity.  Range of motion of the hips, knees, and ankles are full without crepitance, instability, or exacerbation of pain.  Strength is 5/5 throughout.  No redness, abrasions, or lesions on extremities  Gross sensation intact in the extremities.   Affect normal.  Alert and oriented ×3.  Coordination normal.    Assessment:  69-year-old gentleman here for preop visit.  He is scheduled undergo an L2-3, L3-4 laminectomy.  Pre and postoperative information instructions were given to the patient.  The brace was fitted today.  Brace instructions were given to the patient.  Lifting twisting bending restrictions were discussed with the patient.  All questions were answered.  Risks and benefits were discussed with Dr. Godinez on December 24, 2024.  Patient is ready to proceed with surgery.    Plan:  We will see him back in the office 2 weeks after surgery for his postop visit.  We will get AP lateral x-rays at that time.    Ottoniel Mercado PA-C

## 2025-02-26 ENCOUNTER — PRE-ADMISSION TESTING (OUTPATIENT)
Dept: PREADMISSION TESTING | Facility: HOSPITAL | Age: 70
End: 2025-02-26
Payer: MEDICARE

## 2025-02-26 VITALS
DIASTOLIC BLOOD PRESSURE: 78 MMHG | HEIGHT: 78 IN | SYSTOLIC BLOOD PRESSURE: 142 MMHG | OXYGEN SATURATION: 98 % | HEART RATE: 57 BPM | BODY MASS INDEX: 36.45 KG/M2 | RESPIRATION RATE: 17 BRPM | WEIGHT: 315 LBS

## 2025-02-26 DIAGNOSIS — Z01.818 PRE-OP TESTING: ICD-10-CM

## 2025-02-26 PROCEDURE — 87081 CULTURE SCREEN ONLY: CPT | Mod: ELYLAB

## 2025-02-26 NOTE — PREPROCEDURE INSTRUCTIONS
KNEE & HIP JOINT REPLACEMENT PRE-OPERATIVE INSTRUCTIONS     You will receive notification one business day prior to your surgery to confirm your arrival time and any additional information between 2 P.M. - 5 P.M. It is important that you answer your phone and/or check your messages during this time.     You may see in [employer] your surgery start time change several times even up to the day before your procedure. Please disregard those times and only follow the time given by the  who will be notifying you via phone and not a text.     Please arrive at your scheduled time to avoid delay or cancelled surgery.     Please enter the building through either the Main Entrance in front of the hospital or from the Parking Garage Walk Way Bridge. From the parking garage, which is free, take the 2nd Floor Walkway Bridge into the hospital and check in at the [facility] Outpatient Desk as you enter the hospital directly in front of you. If you enter through the Main Entrance take the elevator off the lobby on the right labeled “A” to the 2nd floor and check in at the [facility] Outpatient Surgery desk as you exit the elevator. Wheelchairs are available for use if using the Main Entrance.     Handicap parking in the land lot, in front of hospital by main entrance, wheelchairs are available at this entrance     ?   INSTRUCTIONS:   Please contact your doctor’s office, who is doing procedure, about any changes in your health, bad cold, fever, sore throat, or COVID within last 4 weeks     Talk to your surgeon for instructions if you should stop your aspirin, blood thinner, diabetes medicines, weight loss medications, multivitamins or over the counter supplements since many surgeons have you adjust or stop these medications prior to procedure. The doctor’s office may have you contact the prescribing doctor for medication adjustments for your surgery.     If you take certain medications like Beta Blocker or Anti-Seizure medication, you may have  to take them the morning of procedure with a sip of water. If this is the case your surgeons office should let you know, and the PAT nurses will follow up when they speak to you to verify you are aware.     If not staying overnight after your surgery, and you are receiving any type of anesthesia with your surgery, you must have an adult (age 18 or older) immediately available to drive you home after surgery or your procedure will be cancelled. You may be discharged home after surgery per an Uber, Lyft, Taxi or any other transportation service as long as the responsible adult (18 or older) is in the vehicle with you at time of discharge. The  of these transportation services is not responsible for your care and cannot be consider a responsible adult. We also highly recommend you have someone stay with you for the entire day and night of your surgery.     All jewelry and piercings must be removed. If you are unable to remove an item or have a dermal piercing, please be sure to tell the nurse when you arrive for surgery.     Nail polish must be removed off one finger of each hand     Make-up or other beauty products (lotion, deodorant, hairspray, perfume, etc.) must be removed or not used for day of surgery.     Avoid smoking, consuming alcohol, or any medical or recreational drug use for 24 hours before surgery.     Do not wear contacts to hospital, bring your glasses and a case     Leave valuables at home except photo ID, insurance card and any co-payment that has been requested by hospital.     For adults who are unable to consent or make medical decisions for themselves, a legal guardian or Power of  must accompany them to the surgery center. If this is not possible, please call [phone] to make additional arrangements.  If there is any guardianship or legal Power of  paperwork, please bring them the day of surgery.     Wear comfortable, loose fitting clothing into the procedure.  While  your admitted you are asked to bring short sleeve shirts, shorts (loose like pajamas, sweat shorts), and tennis shoes/sneakers.  No slip on shoes.     Please bring your 2-Wheeled Walker with you the day of surgery and the [facility] Booklet that was given to you during your PAT appointment.     The nurse practitioners, , , physical therapist, and occupational therapist will all discuss and work with you during your stay to help with discharge, physical therapy and any other needs. They also may discuss a program called Meds to Beds, where postoperative medications prescribed to you after surgery will be filled and delivered to your beside before you leave, so that you do not need to stop at the pharmacy on the way home    If you use a CPAP or BIPAP, please make sure the PAT nurse was able to get the settings from you, if not please inform the nurses the day of surgery of your settings you use at home.     Additional instructions about eating and drinking before surgery:     Do not eat any solid foods?or drink anything after midnight the night prior to your procedure. Milk, nutritional drinks/supplements, and infant formula are considered solid foods.  This also includes no gum, candy, lozenges, ice, or any other oral consumption.     CHG SOAP & ORAL RINSE   In regards to bathing, please follow the instructions explained to you at the PAT appointment about taking a showering with the CHG soap the 5 nights prior to your surgery.       During your PAT Appointment you were given [employer] CHG Wash Soap (bottles of bubble gum pink soap) to use before procedure.  Begin using the CHG soap 5 days before your scheduled surgery.  Be sure to sleep on clean sheets - change your sheets the first night you do this cleansing process (you don't not need to change them every night).     CHG SOAP INSTRUCTIONS:  Begin using the CHG soap 5 days prior to your scheduled surgery.  You will wash and  rinse your face and genitals with normal soap.  The night before surgery is the ONLY TIME you use the CHG SOAP for your hair, and do NOT use conditioner after washing with the CHG Soap.  For the rest of the showers the 5 days prior to surgery you will use normal shampoo.  Hair extensions should be removed.  Then apply CHG soap solution to a clean wet washcloth.     Turn the water off or move away from the water spray to avoid premature rinsing of the CHG soap as you apply it.   Avoid getting the CHG soap in your ears, eyes, and mouth.  Apply the CHG soap to entire body from the neck down EXCEPT your face and genitals.  Allow the CHG soap to sit for 3 minutes on your skin.  Then turn on water and rinse the CHG solution off your body completely.    DO NOT wash with regular soap after you have used the CHG soap   Pat yourself dry with a clean, fresh-laundered towel when you get out of the shower and clean Pj's  DO NOT apply any powders, deodorants, or lotions after shower   Be aware the CHG soap will stain fabrics if you wash them with bleach after use.   Make sure to pay special attention to cleaning area(s) where your incision(s) will be located. Avoid scrubbing your skin to hard.  You will repeat this same process steps 1-12 for each shower you take prior to surgery.  The morning of  the surgery is the fifth day.      ORAL RINSE   You will receive a call or notification from your pharmacy to  a prescription prior to procedure.  Be sure to  the prescription oral rinse from your local pharmacy.   You will be using the oral rinse the night before and the morning of surgery.    Take a cap full of the solution, 15mL   Swish (gargle if you can) the mouthwash in your mouth for at least 30 seconds, (DO NOT SWALLOW), and spit out   After the oral CHG rinse, do not rinse your mouth with water, drink, or eat.      If you have to take a medication the morning of surgery as instructed by your surgeon- please take  that medication with a sip of water prior to doing the oral rinse the day of surgery.       ?If you have any questions or concerns, please call Pre-Admission Testing at [phone] or your Physician’s office Dr. Dev Godinez  [phone]  [place] [facility] General Line: [phone]

## 2025-02-28 LAB — STAPHYLOCOCCUS SPEC CULT: NORMAL

## 2025-03-01 DIAGNOSIS — Z01.818 PRE-OPERATIVE CLEARANCE: Primary | ICD-10-CM

## 2025-03-01 RX ORDER — CHLORHEXIDINE GLUCONATE ORAL RINSE 1.2 MG/ML
15 SOLUTION DENTAL DAILY
Qty: 30 ML | Refills: 0 | Status: SHIPPED | OUTPATIENT
Start: 2025-03-01 | End: 2025-03-03

## 2025-03-04 PROBLEM — M54.40 BACK PAIN OF LUMBAR REGION WITH SCIATICA: Status: ACTIVE | Noted: 2025-03-04

## 2025-03-05 ENCOUNTER — HOSPITAL ENCOUNTER (OUTPATIENT)
Dept: RADIOLOGY | Facility: EXTERNAL LOCATION | Age: 70
Discharge: HOME | End: 2025-03-05

## 2025-03-05 ENCOUNTER — DOCUMENTATION (OUTPATIENT)
Dept: ORTHOPEDIC SURGERY | Facility: CLINIC | Age: 70
End: 2025-03-05
Payer: MEDICARE

## 2025-03-05 ENCOUNTER — ANESTHESIA (OUTPATIENT)
Dept: OPERATING ROOM | Facility: HOSPITAL | Age: 70
End: 2025-03-05
Payer: MEDICARE

## 2025-03-05 ENCOUNTER — HOSPITAL ENCOUNTER (OUTPATIENT)
Dept: RADIOLOGY | Facility: CLINIC | Age: 70
Discharge: HOME | End: 2025-03-05
Payer: MEDICARE

## 2025-03-05 ENCOUNTER — HOSPITAL ENCOUNTER (OUTPATIENT)
Facility: HOSPITAL | Age: 70
Setting detail: OUTPATIENT SURGERY
Discharge: HOME | End: 2025-03-05
Attending: ORTHOPAEDIC SURGERY | Admitting: ORTHOPAEDIC SURGERY
Payer: MEDICARE

## 2025-03-05 ENCOUNTER — OFFICE VISIT (OUTPATIENT)
Dept: ORTHOPEDIC SURGERY | Facility: CLINIC | Age: 70
End: 2025-03-05
Payer: MEDICARE

## 2025-03-05 ENCOUNTER — ANESTHESIA EVENT (OUTPATIENT)
Dept: OPERATING ROOM | Facility: HOSPITAL | Age: 70
End: 2025-03-05
Payer: MEDICARE

## 2025-03-05 ENCOUNTER — APPOINTMENT (OUTPATIENT)
Dept: RADIOLOGY | Facility: HOSPITAL | Age: 70
End: 2025-03-05
Payer: MEDICARE

## 2025-03-05 VITALS
RESPIRATION RATE: 18 BRPM | BODY MASS INDEX: 35.97 KG/M2 | TEMPERATURE: 99.3 F | HEART RATE: 89 BPM | HEIGHT: 78 IN | OXYGEN SATURATION: 95 % | WEIGHT: 310.85 LBS | DIASTOLIC BLOOD PRESSURE: 75 MMHG | SYSTOLIC BLOOD PRESSURE: 110 MMHG

## 2025-03-05 DIAGNOSIS — M54.40 BACK PAIN OF LUMBAR REGION WITH SCIATICA: Primary | ICD-10-CM

## 2025-03-05 DIAGNOSIS — M25.461 EFFUSION OF RIGHT KNEE: ICD-10-CM

## 2025-03-05 DIAGNOSIS — M25.561 ACUTE PAIN OF RIGHT KNEE: ICD-10-CM

## 2025-03-05 DIAGNOSIS — M25.561 ACUTE PAIN OF RIGHT KNEE: Primary | ICD-10-CM

## 2025-03-05 DIAGNOSIS — M10.9 ACUTE GOUT OF RIGHT KNEE, UNSPECIFIED CAUSE: ICD-10-CM

## 2025-03-05 LAB
CLARITY FLD: ABNORMAL
COLOR FLD: YELLOW
CRYSTALS FLD MICRO: ABNORMAL
GLUCOSE BLD MANUAL STRIP-MCNC: 185 MG/DL (ref 74–99)
RBC # FLD AUTO: 62 /UL
WBC # FLD AUTO: ABNORMAL /UL

## 2025-03-05 PROCEDURE — 89050 BODY FLUID CELL COUNT: CPT | Performed by: INTERNAL MEDICINE

## 2025-03-05 PROCEDURE — 2500000004 HC RX 250 GENERAL PHARMACY W/ HCPCS (ALT 636 FOR OP/ED): Performed by: INTERNAL MEDICINE

## 2025-03-05 PROCEDURE — 20611 DRAIN/INJ JOINT/BURSA W/US: CPT | Mod: RT | Performed by: INTERNAL MEDICINE

## 2025-03-05 PROCEDURE — 1036F TOBACCO NON-USER: CPT | Performed by: INTERNAL MEDICINE

## 2025-03-05 PROCEDURE — 73560 X-RAY EXAM OF KNEE 1 OR 2: CPT | Mod: RT

## 2025-03-05 PROCEDURE — 99214 OFFICE O/P EST MOD 30 MIN: CPT | Mod: 25 | Performed by: INTERNAL MEDICINE

## 2025-03-05 PROCEDURE — 89060 EXAM SYNOVIAL FLUID CRYSTALS: CPT | Mod: ELYLAB | Performed by: INTERNAL MEDICINE

## 2025-03-05 PROCEDURE — 87205 SMEAR GRAM STAIN: CPT | Mod: ELYLAB | Performed by: INTERNAL MEDICINE

## 2025-03-05 PROCEDURE — 4010F ACE/ARB THERAPY RXD/TAKEN: CPT | Performed by: INTERNAL MEDICINE

## 2025-03-05 PROCEDURE — 3048F LDL-C <100 MG/DL: CPT | Performed by: INTERNAL MEDICINE

## 2025-03-05 PROCEDURE — 99214 OFFICE O/P EST MOD 30 MIN: CPT | Performed by: INTERNAL MEDICINE

## 2025-03-05 PROCEDURE — 82947 ASSAY GLUCOSE BLOOD QUANT: CPT

## 2025-03-05 PROCEDURE — 3044F HG A1C LEVEL LT 7.0%: CPT | Performed by: INTERNAL MEDICINE

## 2025-03-05 RX ORDER — COLCHICINE 0.6 MG/1
TABLET ORAL
Qty: 6 TABLET | Refills: 0 | Status: SHIPPED | OUTPATIENT
Start: 2025-03-05

## 2025-03-05 RX ORDER — ACETAMINOPHEN 325 MG/1
650 TABLET ORAL ONCE
Status: DISCONTINUED | OUTPATIENT
Start: 2025-03-05 | End: 2025-03-05 | Stop reason: HOSPADM

## 2025-03-05 RX ORDER — CEFAZOLIN SODIUM 2 G/100ML
2 INJECTION, SOLUTION INTRAVENOUS ONCE
Status: DISCONTINUED | OUTPATIENT
Start: 2025-03-05 | End: 2025-03-05 | Stop reason: HOSPADM

## 2025-03-05 RX ORDER — CELECOXIB 200 MG/1
200 CAPSULE ORAL ONCE
Status: DISCONTINUED | OUTPATIENT
Start: 2025-03-05 | End: 2025-03-05 | Stop reason: HOSPADM

## 2025-03-05 RX ORDER — BETAMETHASONE SODIUM PHOSPHATE AND BETAMETHASONE ACETATE 3; 3 MG/ML; MG/ML
2 INJECTION, SUSPENSION INTRA-ARTICULAR; INTRALESIONAL; INTRAMUSCULAR; SOFT TISSUE
Status: COMPLETED | OUTPATIENT
Start: 2025-03-05 | End: 2025-03-05

## 2025-03-05 RX ORDER — TRANEXAMIC ACID 650 MG/1
1950 TABLET ORAL ONCE
Status: DISCONTINUED | OUTPATIENT
Start: 2025-03-05 | End: 2025-03-05 | Stop reason: HOSPADM

## 2025-03-05 RX ORDER — LIDOCAINE HYDROCHLORIDE 10 MG/ML
6 INJECTION, SOLUTION INFILTRATION; PERINEURAL
Status: COMPLETED | OUTPATIENT
Start: 2025-03-05 | End: 2025-03-05

## 2025-03-05 RX ADMIN — LIDOCAINE HYDROCHLORIDE 6 ML: 10 INJECTION, SOLUTION INFILTRATION; PERINEURAL at 12:08

## 2025-03-05 RX ADMIN — BETAMETHASONE SODIUM PHOSPHATE AND BETAMETHASONE ACETATE 2 ML: 3; 3 INJECTION, SUSPENSION INTRA-ARTICULAR; INTRALESIONAL; INTRAMUSCULAR at 12:08

## 2025-03-05 ASSESSMENT — PAIN DESCRIPTION - DESCRIPTORS: DESCRIPTORS: NUMBNESS

## 2025-03-05 ASSESSMENT — PAIN SCALES - GENERAL
PAINLEVEL_OUTOF10: 0 - NO PAIN
PAINLEVEL_OUTOF10: 0 - NO PAIN

## 2025-03-05 ASSESSMENT — COLUMBIA-SUICIDE SEVERITY RATING SCALE - C-SSRS
6. HAVE YOU EVER DONE ANYTHING, STARTED TO DO ANYTHING, OR PREPARED TO DO ANYTHING TO END YOUR LIFE?: NO
1. IN THE PAST MONTH, HAVE YOU WISHED YOU WERE DEAD OR WISHED YOU COULD GO TO SLEEP AND NOT WAKE UP?: NO
2. HAVE YOU ACTUALLY HAD ANY THOUGHTS OF KILLING YOURSELF?: NO

## 2025-03-05 ASSESSMENT — PAIN - FUNCTIONAL ASSESSMENT: PAIN_FUNCTIONAL_ASSESSMENT: 0-10

## 2025-03-05 NOTE — PROGRESS NOTES
Acute Injury New Patient Visit    CC:   Chief Complaint   Patient presents with    Right Knee - Pain       HPI: Brando is a 69 y.o. male presents today for evaluation for acute right knee pain and swelling. He was scheduled for a laminectomy today, which was subsequently cancelled due to his symptoms. He is here for initial evaluation.  Increased pain and swelling of the right knee and the right first and second toe.  Has a known history of gout.  He states he did have a lot of [place] over the weekend with kale, which usually triggers his gout.        Review of Systems   GENERAL: Negative for malaise, significant weight loss, fever  MUSCULOSKELETAL: See HPI  NEURO:  Negative for numbness / tingling     Past Medical History  Past Medical History:   Diagnosis Date    Ankle sprain     Cancer (Multi)     Delayed emergence from general anesthesia     Diabetes mellitus (Multi)     Fracture, foot     GERD (gastroesophageal reflux disease)     Gout     H/O left radical nephrectomy     Hypertension     Knee sprain     Lumbosacral disc disease     Renal cancer (Multi)     Spinal stenosis     Type 2 diabetes mellitus        Medication review  Medication Documentation Review Audit       Reviewed by Damaris Martel RN (Registered Nurse) on 03/05/25 at 1012      Medication Order Taking? Sig Documenting Provider Last Dose Status   blood-glucose sensor (FreeStyle Demetria 3 Plus Sensor) device [id number] Yes  Historical Provider, MD 3/4/2025 Active   chlorhexidine (Peridex) 0.12 % solution [id number] Yes Use 15 mL in the mouth or throat once daily for 2 doses. 15 mls swish and spit the night before surgery and 15mls swish and spit the morning of surgery do not swallow VIOLET Llamas 3/4/2025 Evening Active   fluticasone (Flonase) 50 mcg/actuation nasal spray [id number] Yes Administer 1 spray into each nostril 2 times a day. Shake gently. Before first use, prime pump. After use, clean tip and replace cap. Historical Provider, MD  Past Month Active   lisinopril 5 mg tablet [id number] Yes Take 1 tablet (5 mg) by mouth once daily. Historical Provider, MD 3/4/2025 Morning Active   Ozempic 0.25 mg or 0.5 mg (2 mg/3 mL) pen injector [id number] Yes Inject 0.5 mg under the skin 1 (one) time per week. Historical Provider, MD Past Month Active   rosuvastatin (Crestor) 40 mg tablet [id number] Yes Take 1 tablet (40 mg) by mouth once daily. Historical Provider, MD 3/4/2025 Morning Active   Tresiba FlexTouch U-100 100 unit/mL (3 mL) injection [id number] No Inject 4 Units under the skin once daily as needed.   Patient not taking: Reported on 3/5/2025    Historical Provider, MD Not Taking  [id number] [phone]                    Allergies  Allergies   Allergen Reactions    Codeine Other    Magnesium Oxide Diarrhea    Procaine Other     Other Reaction(s): tachycardia    Morphine Other and Nausea And Vomiting     nausea    Other Reaction(s): GI Upset       Social History  Social History     Socioeconomic History    Marital status:      Spouse name: Not on file    Number of children: Not on file    Years of education: Not on file    Highest education level: Not on file   Occupational History    Not on file   Tobacco Use    Smoking status: Never    Smokeless tobacco: Never   Vaping Use    Vaping status: Never Used   Substance and Sexual Activity    Alcohol use: Never    Drug use: Never    Sexual activity: Never   Other Topics Concern    Not on file   Social History Narrative    Not on file     Social Drivers of Health     Financial Resource Strain: Not on file   Food Insecurity: Not on file   Transportation Needs: Not on file   Physical Activity: Not on file   Stress: No Stress Concern Present (2024)    Received from [facility], [facility] [place] of Occupational Health - Occupational Stress Questionnaire     Feeling of Stress : Not at all   Social Connections: Not on file   Intimate Partner Violence: Not on file   Housing  Stability: Not on file       Surgical History  Past Surgical History:   Procedure Laterality Date    COLONOSCOPY      DENTAL IMPLANT      HERNIA REPAIR Left     inguinal    INCISION AND DRAINAGE OF WOUND Right     Index finger    NASAL SINUS SURGERY      NEPHRECTOMY Left     TONSILLECTOMY         Physical Exam:  GENERAL:  Patient is awake, alert, and oriented to person place and time.  Patient appears well nourished and well kept.  Affect Calm, Not Acutely Distressed.  HEENT:  Normocephalic, Atraumatic, EOMI  CARDIOVASCULAR:  Hemodynamically stable.  RESPIRATORY:  Normal respirations with unlabored breathing.  Extremity: Right knee examination shows skin is intact.  There is no erythema or warmth.  2+ effusion.  Can flex the right knee to the degrees with pain.  Full extension at 0 degrees.  Pain dianna the medial joint line.  Pain over the lateral joint line.  There is no pain over the patellar or quadricep tendon.  No pain over the proximal tibia.  No pain over the popliteal fossa.  Negative valgus stress test.  Negative varus stress test.  Negative Desiree's test medially with no instability.  Negative Desiree's test laterally with no instability.  Negative Lachman's test.  Patellar and quadricep mechanism intact.  Negative anterior and posterior drawer test.  Negative patellar apprehension test.  Distal pulses are palpable, neurovascularly intact.  Walking with no significant antalgic gait.      Diagnostics: X-rays reviewed      Procedure: L Inj/Asp: R knee on 3/5/2025 12:08 PM  Indications: pain, joint swelling and diagnostic evaluation  Details: 18 G needle, ultrasound-guided lateral approach  Medications: 2 mL betamethasone acet,sod phos 6 mg/mL; 6 mL lidocaine 10 mg/mL (1 %)  Aspirate: 70 mL clear, cloudy and yellow; sent for lab analysis  Outcome: tolerated well, no immediate complications  Procedure, treatment alternatives, risks and benefits explained, specific risks discussed. Consent was given by the  patient. Immediately prior to procedure a time out was called to verify the correct patient, procedure, equipment, support staff and site/side marked as required. Patient was prepped and draped in the usual sterile fashion.             Assessment:   Acute gout of the right knee  Acute gout of the right great toe and second toe gout    Plan: Brando  presents today for evaluation for acute right knee pain and swelling, and right great toe and second toe swelling secondary to gout.  We did recommend an ultrasound-guided aspiration and corticosteroid injection to the right knee, risk and benefits of the procedure were discussed.  He was agreeable to the procedure.  Recommended to closely monitor his diabetes after the injection.  Will place him on colchicine for his gout of the right great toe and second toe.  We discussed any increased fevers, chills or erythema or warmth, he should call the office or go directly to the emergency room.  He will follow-up this Friday for reevaluation.    Orders Placed This Encounter    XR knee right 3 views      At the conclusion of the visit there were no further questions by the patient/family regarding their plan of care.  Patient was instructed to call or return with any issues, questions, or concerns regarding their injury and/or treatment plan described above.     03/05/25 at 11:25 AM - Nacho Palumbo MD  Scribe Attestation  By signing my name below, I, Madi Gómezbeth, [employer]   attest that this documentation has been prepared under the direction and in the presence of Nacho Palumbo MD.    Office: [phone]    This note was prepared using voice recognition software.  The details of this note are correct and have been reviewed, and corrected to the best of my ability.  Some grammatical errors may persist related to the Dragon software.

## 2025-03-05 NOTE — PROGRESS NOTES
Patient presented to the hospital and he had a gout flareup of his right great and second toes.  It was the IP joint of his great toe and the DIP joint of his second toe.  He also had a small to moderate effusion of his right knee but no redness warmth or swelling.  He said he is having difficulty walking and he is needing a wheelchair to get around because of the pain in his right knee and toes.  Therefore, after long discussion with him I explained to him that is probably not a good idea to proceed on with a laminectomy because he cannot ambulate at this point and he will need to start walking immediately after spine surgery.  Therefore, we are going to cancel his surgery.  I strongly recommended he see his primary care doctor today to for gout treatment.  I also recommended he go to our office to a walk-in clinic where he can have an aspiration of his right knee to see if this is infection versus gout.  It be better to make that more accurate diagnosis and he understands that.  He can also present to the ER if he would like.  Either way we need to make a more accurate diagnosis of his knee infection and follow his toe situation.  Once his peripheral limb issues are addressed by my colleagues and he is healed with that he will follow back up in our office and we will schedule him for his spine surgery.

## 2025-03-06 LAB — PATH REVIEW-CRYSTALS: NORMAL

## 2025-03-06 NOTE — H&P (VIEW-ONLY)
Chart Review:    Have reviewed previous notes, labs, consults.   Back surgery was canceled because of acute gout.  Patient had a joint aspiration showing monosodium urate crystals   A1c a month ago was 6.7   Creatinine 1.37 with a GFR of 56     Patient has been eating a lot of Kale which is very high in purines.  He was ready to have his back surgery and his knee blew up and was extremely painful.  He was seen by Orthopedics on Monday and 70 mL of joint fluid was withdrawn which was full of uric acid crystals.  He had cortisone injected and was put on colchicine initially two and then 1 more an hour later, followed by daily, which will end tomorrow   In the past, the patient did have gout and was on allopurinol 300 mg daily without side effects     IMPRESSION/PLAN:  1. Acute idiopathic gout of right knee (Primary)   The plan is to continue the colchicine to prevent any flare caused by starting allopurinol again.  Allopurinol can prevent the gout but can not treat it.  I have given you another 10 days of colchicine and you would start the allopurinol on Monday.  We should recheck kidney function and uric acid level probably in a week or 2  - colchicine 0.6 MG tablet; Daily for 10 days.  Start allopurinol in 3 days  Dispense: 10 tablet; Refill: 1  - allopurinol (Zyloprim) 300 MG tablet; Take 1 tablet (300 mg) by mouth Daily  Dispense: 30 tablet; Refill: 11    2. Chronic kidney disease, stage 3a (HCC) (CMS/HCC)   You have tolerated this allopurinol before without any problems but to be safe we need to recheck kidney function    3. Morbid (severe) obesity due to excess calories (CMS/HCC)   You are already working on this    5. Body mass index (BMI) 35.0-35.9, adult   Ditto    6. Sick sinus syndrome (CMS/HCC)    This means your heart rate went from too slow to too fast and showed up when you had the 7 day monitor.  Dr. Mckenna is aware    I have reviewed and reconciled the history and medication list with the patient  today.

## 2025-03-07 ENCOUNTER — OFFICE VISIT (OUTPATIENT)
Dept: ORTHOPEDIC SURGERY | Facility: CLINIC | Age: 70
End: 2025-03-07
Payer: MEDICARE

## 2025-03-07 DIAGNOSIS — M54.10 BACK PAIN WITH RADICULOPATHY: Primary | ICD-10-CM

## 2025-03-07 PROCEDURE — 99215 OFFICE O/P EST HI 40 MIN: CPT | Performed by: ORTHOPAEDIC SURGERY

## 2025-03-07 NOTE — PROGRESS NOTES
Chief complaint follow-up.    HPI: Patient surgery was canceled because he had a gout flareup in his right knee and right toes.  He came to the office here and saw Dr. Palumbo who aspirated his right knee and diagnosed him with gout and started him on some allopurinol.  He is also on colchicine as he saw his primary care doctor today.  Colchicine is going around a 10-day course.  He had an injection into his right knee.  He is not having any significant pain anymore he is walking again.  He does not need a wheelchair anymore.  He is suitable to resume with surgery when he is off his colchicine as that could have some issues with postoperative infections.  We will therefore reschedule him for his surgery for at least 10 days out from now and proceed on with the surgery as scheduled.  The patient is still severely inhibited bodily function given his back and leg symptoms although his left leg hurts more than his right now because that is the radiculopathy side.  The right knee and foot were examined and looks like he has significant decreased erythema and has a lot less pain and tenderness with good range of motion of his right knee.  We again discussed surgery and reviewed the risks of surgery that were discussed last time with him and he still wants to proceed with surgery.  And we will proceed forward with the L2-3 and L3-4 midline laminectomy.  He does not need any sort of fusion..  We will also do an excision of facet cyst.

## 2025-03-08 LAB
BACTERIA FLD CULT: NORMAL
GRAM STN SPEC: NORMAL
GRAM STN SPEC: NORMAL

## 2025-03-18 ENCOUNTER — APPOINTMENT (OUTPATIENT)
Dept: ORTHOPEDIC SURGERY | Facility: CLINIC | Age: 70
End: 2025-03-18
Payer: MEDICARE

## 2025-03-25 RX ORDER — CHLORHEXIDINE GLUCONATE ORAL RINSE 1.2 MG/ML
SOLUTION DENTAL
COMMUNITY
Start: 2025-03-01

## 2025-03-25 RX ORDER — ALLOPURINOL 300 MG/1
300 TABLET ORAL
COMMUNITY
Start: 2025-03-07 | End: 2026-03-07

## 2025-03-25 NOTE — PREPROCEDURE INSTRUCTIONS
SPINAL SURGERY (BACK OR NECK) PRE-OPERATIVE INSTRUCTIONS     You will receive notification one business day prior to your surgery to confirm your arrival time and any additional information between 2 P.M. - 5 P.M. It is important that you answer your phone and/or check your messages during this time.     [facility] your [facility] start time change several times even up to the day before your procedure. Please disregard those times and only follow the time given by the  who will be notifying you via phone and not a text.     Please arrive at your scheduled time to avoid delay or cancelled surgery.     Please enter the building through either the Main Entrance in front of the hospital or from the Parking Garage Walk Way Bridge. From the parking garage, which is free, take the 2nd Floor Walkway Bridge into the hospital and check in at the [facility] Outpatient Desk as you enter the hospital directly in front of you. If you enter through the Main Entrance take the elevator off the lobby on the right labeled “A” to the 2nd floor and check in at the [facility] Outpatient Surgery desk as you exit the elevator. Wheelchairs are available for use if using the Main Entrance.     Handicap parking in the land lot, in front of hospital by main entrance, wheelchairs are available at this entrance     ?   INSTRUCTIONS:   Please contact your doctor’s office, who is doing procedure, about any changes in your health, bad cold, fever, sore throat, or COVID within last 4 weeks     Talk to your surgeon for instructions if you should stop your aspirin, blood thinner, diabetes medicines, weight loss medications, multivitamins or over the counter supplements since many surgeons have you adjust or stop these medications prior to procedure. The doctor’s office may have you contact the prescribing doctor for medication adjustments for your surgery.     If you take certain medications like Beta Blocker or Anti-Seizure medication, you may  have to take them the morning of procedure with a sip of water. If this is the case your surgeons office should let you know, and the PAT nurses will follow up when they speak to you to verify you are aware.     If not staying overnight after your surgery, and you are receiving any type of anesthesia with your surgery, you must have an adult (age 18 or older) immediately available to drive you home after surgery or your procedure will be cancelled. You may be discharged home after surgery per an Uber, Lyft, Taxi or any other transportation service as long as the responsible adult (18 or older) is in the vehicle with you at time of discharge. The  of these transportation services is not responsible for your care and cannot be consider a responsible adult. We also highly recommend you have someone stay with you for the entire day and night of your surgery.     All jewelry and piercings must be removed. If you are unable to remove an item or have a dermal piercing, please be sure to tell the nurse when you arrive for surgery.     Nail polish must be removed off one finger of each hand     Make-up or other beauty products (lotion, deodorant, hairspray, perfume, etc.) must be removed or not used for day of surgery.     Avoid smoking, consuming alcohol, or any medical or recreational drug use for 24 hours before surgery.     Do not wear contacts to hospital, bring your glasses and a case     Leave valuables at home except photo ID, insurance card and any co-payment that has been requested by hospital.     For adults who are unable to consent or make medical decisions for themselves, a legal guardian or Power of  must accompany them to the surgery center. If this is not possible, please call [phone] to make additional arrangements.  If there is any guardianship or legal Power of  paperwork, please bring them the day of surgery.     Wear comfortable, loose fitting clothing into the procedure.   While your admitted you are asked to bring short sleeve shirts, shorts (loose like pajamas, sweat shorts), and tennis shoes/sneakers.  No slip on shoes.     Please bring your back or neck brace with you the day of surgery.    Some back and neck patients are able to go home the same day, while others have to be admitted for a short stay overnight.  If that is the case the nurse practitioners, , , physical therapist, and occupational therapist will all discuss and work with you during your stay to help with discharge, physical therapy and any other needs. They also may discuss a program called Meds to Beds, where postoperative medications prescribed to you after surgery will be filled and delivered to your beside before you leave, so that you do not need to stop at the pharmacy on the way home    If you use a CPAP or BIPAP, please make sure the PAT nurse was able to get the settings from you, if not please inform the nurses the day of surgery of your settings you use at home.     Additional instructions about eating and drinking before surgery:     Do not eat any solid foods?or drink anything after midnight the night prior to your procedure. Milk, nutritional drinks/supplements, and infant formula are considered solid foods.  This also includes no gum, candy, lozenges, ice, or any other oral consumption.     CHG SOAP    In regards to bathing, please follow the instructions explained to you at the PAT appointment about taking a showering with the CHG soap the 5 nights prior to your surgery.       During your PAT Appointment you were given [employer] CHG Wash Soap (bottles of bubble gum pink soap) to use before procedure.  Begin using the CHG soap 5 days before your scheduled surgery.  Be sure to sleep on clean sheets - change your sheets the first night you do this cleansing process (you don't not need to change them every night).     5 days prior to surgery at night- 4 days prior to surgery   at night- 3 days prior to surgery at night- Morning of Surgery CHG Soap-  You will take a shower in the evening before bed or your last time the AM of procedure  You will wash and rinse your face, genitals, and hair with normal soap and normal shampoo.     Then apply CHG soap solution to wet washcloth.     Turn the water off or move away from the water spray to avoid premature rinsing of the CHG soap as you apply it.   Apply the CHG soap to entire body from the neck down EXCEPT your face and genitals.  Allow the CHG soap to sit for 3 minutes on your skin.  Then turn on water and rinse the CHG solution off your body completely.    DO NOT wash with regular soap after you have used the CHG soap   Pat yourself dry with a clean, fresh-laundered towel when you get out of the shower and clean Pj's  DO NOT apply any powders, deodorants, or lotions after shower   Be aware the CHG soap will stain fabrics if you wash them with bleach after use.   Make sure to pay special attention to cleaning area(s) where your incision(s) will be located.   You will repeat this same process steps 1-12: 5 days prior at night- 4 days prior at night- 3 days prior at night- Morning of Surgery     the Night before Surgery  (Process changes slightly)  1. You will take a shower in the evening before bed.  2. You will wash and rinse your face and genitals with normal soap.  3. Then apply CHG soap solution to wet washcloth.     4. Turn the water off or move away from the water spray to avoid premature rinsing of the CHG soap as you apply it.   5. Apply the CHG soap to entire body from the neck down EXCEPT your face and genitals.  6. Wash your hair with CHG soap.  7.Allow the CHG soap to sit for 3 minutes on your skin and in your hair.  Then turn on water and rinse the CHG solution off your body completely.    DO NOT wash with regular soap after you have used the CHG soap   Pat yourself dry with a clean, fresh-laundered towel when you get out of the  shower and clean Pj's  DO NOT apply any powders, deodorants, hair products or lotions after shower   Be aware the CHG soap will stain fabrics if you wash them with bleach after use.   Make sure to pay special attention to cleaning area(s) where your incision(s) will be located.   Put clean clothes on to come in for procedure.    THE NIGHT BEFORE SURGERY IS THE ONLY TIME YOU USE THE CHG WASH FOR YOUR HAIR    ORAL RINSE   You will receive a call or notification from your pharmacy to  a prescription prior to procedure.  Be sure to  the prescription oral rinse from your local pharmacy.   You will be using the oral rinse the night before and the morning of surgery.    Take a cap full of the solution, 15mL     Swish (gargle if you can) the mouthwash in your mouth for at least 30 seconds, (DO NOT SWALLOW), and spit out     After the oral CHG rinse, do not rinse your mouth with water, drink, or eat.      If you have to take a medication the morning of surgery as instructed by your surgeon- please take that medication with a sip of water prior to doing the oral rinse the day of surgery.       ?If you have any questions or concerns, please call Pre-Admission Testing at [phone] or your Physician’s office Dr. Dev Godinez  [phone]  [place] [facility] General Line: [phone]

## 2025-03-26 NOTE — DISCHARGE INSTRUCTIONS
No heavy lifting or straining until patient is seen in the office  Ok to remove dressing in 48 hours and get wound wet in th shower. Do no scrub. Cover wound with dry dressing after shower. No baths, hot tubs, or pools.   Encourage ambulation at least 3 times per day.   No formal physical therapy until ordered by Dr. Godinez  Follow up in the office in two weeks. Appointment made prior to surgery  You must be accompanied by a responsible adult upon discharge and for 24 hours after surgery. Do no drive a motor vehicle, operate machinery, power tools or appliances, drink alcoholic beverages, or make critical decisions for 24 hours and while on narcotic pain medication.  Be aware of dizziness, which may cause a fall. Change positions slowly.   You may resume regular diet, but do so slowly.   Use your pain medication as prescribed by your physician/nurse practitioner/physician assistant. If possible, take your medication with food, and drink plenty of fluids.   Contact surgeon if you have questions, temperature of 101 degree or greater, increased bleeding, swelling, or pain, drainage from the incision or increased redness around the incision   Call [phone] with any questions or concerns

## 2025-03-27 ENCOUNTER — APPOINTMENT (OUTPATIENT)
Dept: RADIOLOGY | Facility: HOSPITAL | Age: 70
DRG: 519 | End: 2025-03-27
Payer: MEDICARE

## 2025-03-27 ENCOUNTER — HOSPITAL ENCOUNTER (INPATIENT)
Facility: HOSPITAL | Age: 70
LOS: 2 days | Discharge: HOME | DRG: 519 | End: 2025-03-29
Attending: ORTHOPAEDIC SURGERY | Admitting: ORTHOPAEDIC SURGERY
Payer: MEDICARE

## 2025-03-27 ENCOUNTER — ANESTHESIA (OUTPATIENT)
Dept: OPERATING ROOM | Facility: HOSPITAL | Age: 70
End: 2025-03-27
Payer: MEDICARE

## 2025-03-27 ENCOUNTER — ANESTHESIA EVENT (OUTPATIENT)
Dept: OPERATING ROOM | Facility: HOSPITAL | Age: 70
End: 2025-03-27
Payer: MEDICARE

## 2025-03-27 DIAGNOSIS — M48.061 SPINAL STENOSIS, LUMBAR REGION WITHOUT NEUROGENIC CLAUDICATION: ICD-10-CM

## 2025-03-27 DIAGNOSIS — M54.40 BACK PAIN OF LUMBAR REGION WITH SCIATICA: Primary | ICD-10-CM

## 2025-03-27 DIAGNOSIS — Z98.890 S/P LUMBAR LAMINECTOMY: ICD-10-CM

## 2025-03-27 LAB
GLUCOSE BLD MANUAL STRIP-MCNC: 163 MG/DL (ref 74–99)
GLUCOSE BLD MANUAL STRIP-MCNC: 235 MG/DL (ref 74–99)
GLUCOSE BLD MANUAL STRIP-MCNC: 243 MG/DL (ref 74–99)
GLUCOSE BLD MANUAL STRIP-MCNC: 254 MG/DL (ref 74–99)

## 2025-03-27 PROCEDURE — 63047 LAM FACETEC & FORAMOT LUMBAR: CPT | Performed by: PHYSICIAN ASSISTANT

## 2025-03-27 PROCEDURE — 63267 EXCISE INTRSPINL LESION LMBR: CPT | Performed by: PHYSICIAN ASSISTANT

## 2025-03-27 PROCEDURE — 63048 LAM FACETEC &FORAMOT EA ADDL: CPT | Performed by: PHYSICIAN ASSISTANT

## 2025-03-27 PROCEDURE — 2500000001 HC RX 250 WO HCPCS SELF ADMINISTERED DRUGS (ALT 637 FOR MEDICARE OP): Performed by: NURSE PRACTITIONER

## 2025-03-27 PROCEDURE — 3700000001 HC GENERAL ANESTHESIA TIME - INITIAL BASE CHARGE: Performed by: ORTHOPAEDIC SURGERY

## 2025-03-27 PROCEDURE — 2500000004 HC RX 250 GENERAL PHARMACY W/ HCPCS (ALT 636 FOR OP/ED): Performed by: STUDENT IN AN ORGANIZED HEALTH CARE EDUCATION/TRAINING PROGRAM

## 2025-03-27 PROCEDURE — 2500000001 HC RX 250 WO HCPCS SELF ADMINISTERED DRUGS (ALT 637 FOR MEDICARE OP): Performed by: PHYSICIAN ASSISTANT

## 2025-03-27 PROCEDURE — 64636 DESTROY L/S FACET JNT ADDL: CPT | Performed by: ORTHOPAEDIC SURGERY

## 2025-03-27 PROCEDURE — 2500000004 HC RX 250 GENERAL PHARMACY W/ HCPCS (ALT 636 FOR OP/ED): Performed by: NURSE ANESTHETIST, CERTIFIED REGISTERED

## 2025-03-27 PROCEDURE — 2500000002 HC RX 250 W HCPCS SELF ADMINISTERED DRUGS (ALT 637 FOR MEDICARE OP, ALT 636 FOR OP/ED): Performed by: PHYSICIAN ASSISTANT

## 2025-03-27 PROCEDURE — 1100000001 HC PRIVATE ROOM DAILY

## 2025-03-27 PROCEDURE — 63267 EXCISE INTRSPINL LESION LMBR: CPT | Performed by: ORTHOPAEDIC SURGERY

## 2025-03-27 PROCEDURE — 82947 ASSAY GLUCOSE BLOOD QUANT: CPT

## 2025-03-27 PROCEDURE — 3600000004 HC OR TIME - INITIAL BASE CHARGE - PROCEDURE LEVEL FOUR: Performed by: ORTHOPAEDIC SURGERY

## 2025-03-27 PROCEDURE — 3700000002 HC GENERAL ANESTHESIA TIME - EACH INCREMENTAL 1 MINUTE: Performed by: ORTHOPAEDIC SURGERY

## 2025-03-27 PROCEDURE — 01NB0ZZ RELEASE LUMBAR NERVE, OPEN APPROACH: ICD-10-PCS | Performed by: ORTHOPAEDIC SURGERY

## 2025-03-27 PROCEDURE — C1763 CONN TISS, NON-HUMAN: HCPCS | Performed by: ORTHOPAEDIC SURGERY

## 2025-03-27 PROCEDURE — 7100000011 HC EXTENDED STAY RECOVERY HOURLY - NURSING UNIT

## 2025-03-27 PROCEDURE — 2500000004 HC RX 250 GENERAL PHARMACY W/ HCPCS (ALT 636 FOR OP/ED)

## 2025-03-27 PROCEDURE — 00QT0ZZ REPAIR SPINAL MENINGES, OPEN APPROACH: ICD-10-PCS | Performed by: ORTHOPAEDIC SURGERY

## 2025-03-27 PROCEDURE — 7100000002 HC RECOVERY ROOM TIME - EACH INCREMENTAL 1 MINUTE: Performed by: ORTHOPAEDIC SURGERY

## 2025-03-27 PROCEDURE — 2720000007 HC OR 272 NO HCPCS: Performed by: ORTHOPAEDIC SURGERY

## 2025-03-27 PROCEDURE — 64635 DESTROY LUMB/SAC FACET JNT: CPT | Performed by: ORTHOPAEDIC SURGERY

## 2025-03-27 PROCEDURE — A9999 DME SUPPLY OR ACCESSORY, NOS: HCPCS | Performed by: ORTHOPAEDIC SURGERY

## 2025-03-27 PROCEDURE — 63047 LAM FACETEC & FORAMOT LUMBAR: CPT | Performed by: ORTHOPAEDIC SURGERY

## 2025-03-27 PROCEDURE — 7100000001 HC RECOVERY ROOM TIME - INITIAL BASE CHARGE: Performed by: ORTHOPAEDIC SURGERY

## 2025-03-27 PROCEDURE — 2500000004 HC RX 250 GENERAL PHARMACY W/ HCPCS (ALT 636 FOR OP/ED): Performed by: PHYSICIAN ASSISTANT

## 2025-03-27 PROCEDURE — 2500000005 HC RX 250 GENERAL PHARMACY W/O HCPCS: Performed by: PHYSICIAN ASSISTANT

## 2025-03-27 PROCEDURE — 2500000005 HC RX 250 GENERAL PHARMACY W/O HCPCS: Performed by: ORTHOPAEDIC SURGERY

## 2025-03-27 PROCEDURE — 63048 LAM FACETEC &FORAMOT EA ADDL: CPT | Performed by: ORTHOPAEDIC SURGERY

## 2025-03-27 PROCEDURE — 2780000003 HC OR 278 NO HCPCS: Performed by: ORTHOPAEDIC SURGERY

## 2025-03-27 PROCEDURE — 2500000002 HC RX 250 W HCPCS SELF ADMINISTERED DRUGS (ALT 637 FOR MEDICARE OP, ALT 636 FOR OP/ED): Performed by: REGISTERED NURSE

## 2025-03-27 PROCEDURE — 3600000009 HC OR TIME - EACH INCREMENTAL 1 MINUTE - PROCEDURE LEVEL FOUR: Performed by: ORTHOPAEDIC SURGERY

## 2025-03-27 DEVICE — DURAGEN® DURAL GRAFT MATRIX 1PK, 2X2 DOM
Type: IMPLANTABLE DEVICE | Site: SPINE LUMBAR | Status: FUNCTIONAL
Brand: DURAGEN®

## 2025-03-27 RX ORDER — CEFAZOLIN SODIUM 2 G/50ML
2 SOLUTION INTRAVENOUS ONCE
Status: DISCONTINUED | OUTPATIENT
Start: 2025-03-27 | End: 2025-03-27 | Stop reason: DRUGHIGH

## 2025-03-27 RX ORDER — SODIUM CHLORIDE 9 MG/ML
100 INJECTION, SOLUTION INTRAVENOUS CONTINUOUS
Status: DISCONTINUED | OUTPATIENT
Start: 2025-03-27 | End: 2025-03-29 | Stop reason: HOSPADM

## 2025-03-27 RX ORDER — CELECOXIB 200 MG/1
200 CAPSULE ORAL ONCE
Status: COMPLETED | OUTPATIENT
Start: 2025-03-27 | End: 2025-03-27

## 2025-03-27 RX ORDER — DEXTROSE 50 % IN WATER (D50W) INTRAVENOUS SYRINGE
25
Status: DISCONTINUED | OUTPATIENT
Start: 2025-03-27 | End: 2025-03-29 | Stop reason: HOSPADM

## 2025-03-27 RX ORDER — ONDANSETRON 4 MG/1
4 TABLET, FILM COATED ORAL EVERY 8 HOURS PRN
Status: DISCONTINUED | OUTPATIENT
Start: 2025-03-27 | End: 2025-03-29 | Stop reason: HOSPADM

## 2025-03-27 RX ORDER — DIPHENHYDRAMINE HYDROCHLORIDE 50 MG/ML
12.5 INJECTION, SOLUTION INTRAMUSCULAR; INTRAVENOUS ONCE AS NEEDED
Status: DISCONTINUED | OUTPATIENT
Start: 2025-03-27 | End: 2025-03-29 | Stop reason: HOSPADM

## 2025-03-27 RX ORDER — MIDAZOLAM HYDROCHLORIDE 1 MG/ML
INJECTION, SOLUTION INTRAMUSCULAR; INTRAVENOUS AS NEEDED
Status: DISCONTINUED | OUTPATIENT
Start: 2025-03-27 | End: 2025-03-27

## 2025-03-27 RX ORDER — LIDOCAINE HYDROCHLORIDE 20 MG/ML
INJECTION, SOLUTION INFILTRATION; PERINEURAL AS NEEDED
Status: DISCONTINUED | OUTPATIENT
Start: 2025-03-27 | End: 2025-03-27

## 2025-03-27 RX ORDER — ONDANSETRON HYDROCHLORIDE 2 MG/ML
4 INJECTION, SOLUTION INTRAVENOUS EVERY 8 HOURS PRN
Status: DISCONTINUED | OUTPATIENT
Start: 2025-03-27 | End: 2025-03-29 | Stop reason: HOSPADM

## 2025-03-27 RX ORDER — NALOXONE HYDROCHLORIDE 1 MG/ML
0.2 INJECTION INTRAMUSCULAR; INTRAVENOUS; SUBCUTANEOUS EVERY 5 MIN PRN
Status: DISCONTINUED | OUTPATIENT
Start: 2025-03-27 | End: 2025-03-29 | Stop reason: HOSPADM

## 2025-03-27 RX ORDER — FENTANYL CITRATE 50 UG/ML
INJECTION, SOLUTION INTRAMUSCULAR; INTRAVENOUS AS NEEDED
Status: DISCONTINUED | OUTPATIENT
Start: 2025-03-27 | End: 2025-03-27

## 2025-03-27 RX ORDER — TRANEXAMIC ACID 650 MG/1
1950 TABLET ORAL ONCE
Status: COMPLETED | OUTPATIENT
Start: 2025-03-27 | End: 2025-03-27

## 2025-03-27 RX ORDER — MEPERIDINE HYDROCHLORIDE 50 MG/ML
12.5 INJECTION INTRAMUSCULAR; INTRAVENOUS; SUBCUTANEOUS EVERY 10 MIN PRN
Status: DISCONTINUED | OUTPATIENT
Start: 2025-03-27 | End: 2025-03-29 | Stop reason: HOSPADM

## 2025-03-27 RX ORDER — ONDANSETRON HYDROCHLORIDE 2 MG/ML
INJECTION, SOLUTION INTRAVENOUS AS NEEDED
Status: DISCONTINUED | OUTPATIENT
Start: 2025-03-27 | End: 2025-03-27

## 2025-03-27 RX ORDER — LIDOCAINE HYDROCHLORIDE 10 MG/ML
0.1 INJECTION, SOLUTION INFILTRATION; PERINEURAL ONCE
Status: DISCONTINUED | OUTPATIENT
Start: 2025-03-27 | End: 2025-03-29 | Stop reason: HOSPADM

## 2025-03-27 RX ORDER — SODIUM CHLORIDE, SODIUM LACTATE, POTASSIUM CHLORIDE, CALCIUM CHLORIDE 600; 310; 30; 20 MG/100ML; MG/100ML; MG/100ML; MG/100ML
100 INJECTION, SOLUTION INTRAVENOUS CONTINUOUS
Status: ACTIVE | OUTPATIENT
Start: 2025-03-27 | End: 2025-03-28

## 2025-03-27 RX ORDER — VANCOMYCIN HYDROCHLORIDE 1 G/20ML
INJECTION, POWDER, LYOPHILIZED, FOR SOLUTION INTRAVENOUS DAILY PRN
Status: DISCONTINUED | OUTPATIENT
Start: 2025-03-27 | End: 2025-03-28

## 2025-03-27 RX ORDER — FENTANYL CITRATE 50 UG/ML
50 INJECTION, SOLUTION INTRAMUSCULAR; INTRAVENOUS EVERY 5 MIN PRN
Status: DISCONTINUED | OUTPATIENT
Start: 2025-03-27 | End: 2025-03-29 | Stop reason: HOSPADM

## 2025-03-27 RX ORDER — OXYCODONE HYDROCHLORIDE 5 MG/1
5 TABLET ORAL EVERY 4 HOURS PRN
Status: DISCONTINUED | OUTPATIENT
Start: 2025-03-27 | End: 2025-03-29 | Stop reason: HOSPADM

## 2025-03-27 RX ORDER — CYCLOBENZAPRINE HCL 10 MG
10 TABLET ORAL 3 TIMES DAILY PRN
Status: DISCONTINUED | OUTPATIENT
Start: 2025-03-27 | End: 2025-03-29 | Stop reason: HOSPADM

## 2025-03-27 RX ORDER — INSULIN LISPRO 100 [IU]/ML
0-10 INJECTION, SOLUTION INTRAVENOUS; SUBCUTANEOUS
Status: DISCONTINUED | OUTPATIENT
Start: 2025-03-27 | End: 2025-03-29 | Stop reason: HOSPADM

## 2025-03-27 RX ORDER — BUPIVACAINE HYDROCHLORIDE 2.5 MG/ML
20 INJECTION, SOLUTION EPIDURAL; INFILTRATION; INTRACAUDAL; PERINEURAL ONCE
Status: COMPLETED | OUTPATIENT
Start: 2025-03-27 | End: 2025-03-27

## 2025-03-27 RX ORDER — CEFAZOLIN SODIUM 3 G/150ML
3 INJECTION, SOLUTION INTRAVENOUS ONCE
Status: COMPLETED | OUTPATIENT
Start: 2025-03-27 | End: 2025-03-27

## 2025-03-27 RX ORDER — DROPERIDOL 2.5 MG/ML
0.62 INJECTION, SOLUTION INTRAMUSCULAR; INTRAVENOUS ONCE AS NEEDED
Status: DISCONTINUED | OUTPATIENT
Start: 2025-03-27 | End: 2025-03-29 | Stop reason: HOSPADM

## 2025-03-27 RX ORDER — ROSUVASTATIN CALCIUM 20 MG/1
40 TABLET, COATED ORAL
Status: DISCONTINUED | OUTPATIENT
Start: 2025-03-28 | End: 2025-03-29 | Stop reason: HOSPADM

## 2025-03-27 RX ORDER — LABETALOL HYDROCHLORIDE 5 MG/ML
5 INJECTION, SOLUTION INTRAVENOUS ONCE AS NEEDED
Status: DISCONTINUED | OUTPATIENT
Start: 2025-03-27 | End: 2025-03-29 | Stop reason: HOSPADM

## 2025-03-27 RX ORDER — VANCOMYCIN HYDROCHLORIDE 1 G/200ML
1 INJECTION, SOLUTION INTRAVENOUS ONCE
Status: DISCONTINUED | OUTPATIENT
Start: 2025-03-27 | End: 2025-03-29 | Stop reason: HOSPADM

## 2025-03-27 RX ORDER — ROCURONIUM BROMIDE 10 MG/ML
INJECTION, SOLUTION INTRAVENOUS AS NEEDED
Status: DISCONTINUED | OUTPATIENT
Start: 2025-03-27 | End: 2025-03-27

## 2025-03-27 RX ORDER — CEFAZOLIN SODIUM 3 G/150ML
3 INJECTION, SOLUTION INTRAVENOUS EVERY 8 HOURS
Status: COMPLETED | OUTPATIENT
Start: 2025-03-27 | End: 2025-03-28

## 2025-03-27 RX ORDER — SODIUM CHLORIDE 0.9 G/100ML
INJECTION, SOLUTION IRRIGATION AS NEEDED
Status: DISCONTINUED | OUTPATIENT
Start: 2025-03-27 | End: 2025-03-27 | Stop reason: HOSPADM

## 2025-03-27 RX ORDER — ACETAMINOPHEN 325 MG/1
650 TABLET ORAL EVERY 6 HOURS
Status: DISCONTINUED | OUTPATIENT
Start: 2025-03-27 | End: 2025-03-29 | Stop reason: HOSPADM

## 2025-03-27 RX ORDER — ALLOPURINOL 300 MG/1
300 TABLET ORAL
Status: DISCONTINUED | OUTPATIENT
Start: 2025-03-28 | End: 2025-03-29 | Stop reason: HOSPADM

## 2025-03-27 RX ORDER — POLYETHYLENE GLYCOL 3350 17 G/17G
17 POWDER, FOR SOLUTION ORAL DAILY
Status: DISCONTINUED | OUTPATIENT
Start: 2025-03-27 | End: 2025-03-29 | Stop reason: HOSPADM

## 2025-03-27 RX ORDER — ACETAMINOPHEN 325 MG/1
650 TABLET ORAL ONCE
Status: COMPLETED | OUTPATIENT
Start: 2025-03-27 | End: 2025-03-27

## 2025-03-27 RX ORDER — DEXTROSE 50 % IN WATER (D50W) INTRAVENOUS SYRINGE
12.5
Status: DISCONTINUED | OUTPATIENT
Start: 2025-03-27 | End: 2025-03-29 | Stop reason: HOSPADM

## 2025-03-27 RX ORDER — LISINOPRIL 5 MG/1
5 TABLET ORAL DAILY
Status: DISCONTINUED | OUTPATIENT
Start: 2025-03-27 | End: 2025-03-29 | Stop reason: HOSPADM

## 2025-03-27 RX ORDER — PROPOFOL 10 MG/ML
INJECTION, EMULSION INTRAVENOUS AS NEEDED
Status: DISCONTINUED | OUTPATIENT
Start: 2025-03-27 | End: 2025-03-27

## 2025-03-27 RX ADMIN — CEFAZOLIN SODIUM 3 G: 3 INJECTION, SOLUTION INTRAVENOUS at 21:30

## 2025-03-27 RX ADMIN — TRANEXAMIC ACID 1950 MG: 650 TABLET ORAL at 10:32

## 2025-03-27 RX ADMIN — PROPOFOL 200 MG: 10 INJECTION, EMULSION INTRAVENOUS at 12:29

## 2025-03-27 RX ADMIN — ROCURONIUM BROMIDE 70 MG: 10 SOLUTION INTRAVENOUS at 12:29

## 2025-03-27 RX ADMIN — FENTANYL CITRATE 25 MCG: 50 INJECTION, SOLUTION INTRAMUSCULAR; INTRAVENOUS at 14:33

## 2025-03-27 RX ADMIN — LIDOCAINE HYDROCHLORIDE 100 MG: 20 INJECTION, SOLUTION INFILTRATION; PERINEURAL at 12:29

## 2025-03-27 RX ADMIN — LISINOPRIL 5 MG: 5 TABLET ORAL at 21:29

## 2025-03-27 RX ADMIN — ROCURONIUM BROMIDE 10 MG: 10 SOLUTION INTRAVENOUS at 13:20

## 2025-03-27 RX ADMIN — PROPOFOL 50 MG: 10 INJECTION, EMULSION INTRAVENOUS at 15:05

## 2025-03-27 RX ADMIN — FENTANYL CITRATE 25 MCG: 50 INJECTION, SOLUTION INTRAMUSCULAR; INTRAVENOUS at 14:13

## 2025-03-27 RX ADMIN — CEFAZOLIN SODIUM 3 G: 3 INJECTION, SOLUTION INTRAVENOUS at 12:37

## 2025-03-27 RX ADMIN — SODIUM CHLORIDE, POTASSIUM CHLORIDE, SODIUM LACTATE AND CALCIUM CHLORIDE 100 ML/HR: 600; 310; 30; 20 INJECTION, SOLUTION INTRAVENOUS at 10:32

## 2025-03-27 RX ADMIN — VANCOMYCIN HYDROCHLORIDE 1 G: 1 INJECTION, POWDER, LYOPHILIZED, FOR SOLUTION INTRAVENOUS at 15:56

## 2025-03-27 RX ADMIN — POVIDONE-IODINE 1 APPLICATION: 5 SOLUTION TOPICAL at 10:32

## 2025-03-27 RX ADMIN — HYDROMORPHONE HYDROCHLORIDE 0.4 MG: 2 INJECTION, SOLUTION INTRAMUSCULAR; INTRAVENOUS; SUBCUTANEOUS at 14:53

## 2025-03-27 RX ADMIN — ROCURONIUM BROMIDE 10 MG: 10 SOLUTION INTRAVENOUS at 13:56

## 2025-03-27 RX ADMIN — ROCURONIUM BROMIDE 30 MG: 10 SOLUTION INTRAVENOUS at 15:03

## 2025-03-27 RX ADMIN — SODIUM CHLORIDE 100 ML/HR: 9 INJECTION, SOLUTION INTRAVENOUS at 21:30

## 2025-03-27 RX ADMIN — SUGAMMADEX 200 MG: 100 INJECTION, SOLUTION INTRAVENOUS at 16:46

## 2025-03-27 RX ADMIN — BUPIVACAINE HYDROCHLORIDE 50 MG: 2.5 INJECTION, SOLUTION EPIDURAL; INFILTRATION; INTRACAUDAL at 12:00

## 2025-03-27 RX ADMIN — ROCURONIUM BROMIDE 10 MG: 10 SOLUTION INTRAVENOUS at 14:33

## 2025-03-27 RX ADMIN — CELECOXIB 200 MG: 200 CAPSULE ORAL at 10:32

## 2025-03-27 RX ADMIN — Medication 3 L/MIN: at 18:45

## 2025-03-27 RX ADMIN — HYDROMORPHONE HYDROCHLORIDE 0.4 MG: 2 INJECTION, SOLUTION INTRAMUSCULAR; INTRAVENOUS; SUBCUTANEOUS at 15:16

## 2025-03-27 RX ADMIN — ONDANSETRON 4 MG: 2 INJECTION INTRAMUSCULAR; INTRAVENOUS at 16:11

## 2025-03-27 RX ADMIN — FENTANYL CITRATE 50 MCG: 50 INJECTION, SOLUTION INTRAMUSCULAR; INTRAVENOUS at 13:24

## 2025-03-27 RX ADMIN — TRANEXAMIC ACID 1950 MG: 650 TABLET ORAL at 21:29

## 2025-03-27 RX ADMIN — HYDROMORPHONE HYDROCHLORIDE 0.4 MG: 2 INJECTION, SOLUTION INTRAMUSCULAR; INTRAVENOUS; SUBCUTANEOUS at 14:43

## 2025-03-27 RX ADMIN — FENTANYL CITRATE 100 MCG: 50 INJECTION, SOLUTION INTRAMUSCULAR; INTRAVENOUS at 12:29

## 2025-03-27 RX ADMIN — INSULIN LISPRO 6 UNITS: 100 INJECTION, SOLUTION INTRAVENOUS; SUBCUTANEOUS at 22:39

## 2025-03-27 RX ADMIN — BUPIVACAINE HYDROCHLORIDE 50 MG: 2.5 INJECTION, SOLUTION EPIDURAL; INFILTRATION; INTRACAUDAL; PERINEURAL at 12:00

## 2025-03-27 RX ADMIN — ACETAMINOPHEN 650 MG: 325 TABLET ORAL at 10:32

## 2025-03-27 RX ADMIN — MIDAZOLAM 2 MG: 1 INJECTION INTRAMUSCULAR; INTRAVENOUS at 12:26

## 2025-03-27 RX ADMIN — DEXAMETHASONE SODIUM PHOSPHATE 8 MG: 4 INJECTION, SOLUTION INTRAMUSCULAR; INTRAVENOUS at 12:53

## 2025-03-27 RX ADMIN — VANCOMYCIN HYDROCHLORIDE 1250 MG: 1.25 INJECTION, POWDER, LYOPHILIZED, FOR SOLUTION INTRAVENOUS at 22:39

## 2025-03-27 RX ADMIN — SODIUM CHLORIDE, POTASSIUM CHLORIDE, SODIUM LACTATE AND CALCIUM CHLORIDE: 600; 310; 30; 20 INJECTION, SOLUTION INTRAVENOUS at 15:06

## 2025-03-27 ASSESSMENT — ACTIVITIES OF DAILY LIVING (ADL)
HEARING - LEFT EAR: FUNCTIONAL
TOILETING: INDEPENDENT
PATIENT'S MEMORY ADEQUATE TO SAFELY COMPLETE DAILY ACTIVITIES?: YES
HEARING - RIGHT EAR: FUNCTIONAL
GROOMING: INDEPENDENT
BATHING: INDEPENDENT
WALKS IN HOME: INDEPENDENT
DRESSING YOURSELF: INDEPENDENT
ADEQUATE_TO_COMPLETE_ADL: YES
JUDGMENT_ADEQUATE_SAFELY_COMPLETE_DAILY_ACTIVITIES: YES
FEEDING YOURSELF: INDEPENDENT

## 2025-03-27 ASSESSMENT — PAIN SCALES - GENERAL
PAINLEVEL_OUTOF10: 2
PAIN_LEVEL: 0
PAINLEVEL_OUTOF10: 0 - NO PAIN

## 2025-03-27 ASSESSMENT — COGNITIVE AND FUNCTIONAL STATUS - GENERAL
MOBILITY SCORE: 24
DAILY ACTIVITIY SCORE: 24
PATIENT BASELINE BEDBOUND: NO

## 2025-03-27 ASSESSMENT — PAIN - FUNCTIONAL ASSESSMENT
PAIN_FUNCTIONAL_ASSESSMENT: 0-10

## 2025-03-27 ASSESSMENT — COLUMBIA-SUICIDE SEVERITY RATING SCALE - C-SSRS
1. IN THE PAST MONTH, HAVE YOU WISHED YOU WERE DEAD OR WISHED YOU COULD GO TO SLEEP AND NOT WAKE UP?: NO
6. HAVE YOU EVER DONE ANYTHING, STARTED TO DO ANYTHING, OR PREPARED TO DO ANYTHING TO END YOUR LIFE?: NO
2. HAVE YOU ACTUALLY HAD ANY THOUGHTS OF KILLING YOURSELF?: NO

## 2025-03-27 ASSESSMENT — PAIN DESCRIPTION - DESCRIPTORS: DESCRIPTORS: SORE

## 2025-03-27 NOTE — PROGRESS NOTES
03/27/25 1945   Discharge Planning   Living Arrangements Alone   Support Systems Family members;Friends/neighbors   Type of Residence Private residence   Do you have animals or pets at home? Yes   Type of Animals or Pets 1 Cat     Pt is s/p Elective L2-3 and L3-4 laminectomy decompressing the L2, 3, and 4 vertebral segments.  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  Thermal ablation of the medial nerve branch supplying the facets bilaterally at L2-3 and L3-4.  Durotomy repair right L2-3 on 3/27/25 with Dr. Godinez. PT/OT eval ordered. CT team will monitor case for progression, and follow up for PT/OT eval [facility] scores to aide in DC planning.

## 2025-03-27 NOTE — ANESTHESIA POSTPROCEDURE EVALUATION
Patient: Brando Barrett    Procedure Summary       Date: 03/27/25 Room / Location: [facility] / [facility]    Anesthesia Start: 1226 Anesthesia Stop:     Procedure: L2-3, L3-4 LAMINECTOMY (Spine Lumbar) Diagnosis:       Spinal stenosis, lumbar region without neurogenic claudication      (Spinal stenosis, lumbar region without neurogenic claudication [M48.061])    Surgeons: Dev Godinez MD Responsible Provider: Stewart Cardenas MD    Anesthesia Type: general ASA Status: 3            Anesthesia Type: general    Vitals Value Taken Time   /69 03/27/25 1656   Temp 36.0 03/27/25 1701   Pulse 56 03/27/25 1659   Resp 10 03/27/25 1659   SpO2 100 % 03/27/25 1659   Vitals shown include unfiled device data.    Anesthesia Post Evaluation    Patient location during evaluation: PACU  Patient participation: complete - patient participated  Level of consciousness: responsive to verbal stimuli  Pain score: 0  Pain management: adequate  Airway patency: patent  Cardiovascular status: hemodynamically stable  Respiratory status: nonlabored ventilation, spontaneous ventilation and face mask  Hydration status: balanced  Postoperative Nausea and Vomiting: none        There were no known notable events for this encounter.

## 2025-03-27 NOTE — ANESTHESIA PROCEDURE NOTES
Airway  Date/Time: 3/27/2025 12:33 PM  Urgency: elective    Airway not difficult    Staffing  Performed: attending   Authorized by: Bashir Hall DO    Performed by: Bashir Hall DO  Patient location during procedure: OR    Indications and Patient Condition  Indications for airway management: anesthesia  Sedation level: deep  Preoxygenated: yes      Final Airway Details  Final airway type: endotracheal airway      Successful airway: ETT     Successful intubation technique: video laryngoscopy  Blade: Sol  Blade size: #4  ETT size (mm): 7.5  Cormack-Lehane Classification: grade I - full view of glottis  Measured from: lips  ETT to lips (cm): 22  Number of attempts at approach: 1

## 2025-03-27 NOTE — ANESTHESIA PREPROCEDURE EVALUATION
"Patient: Brando Barrett    Procedure Information       Date/Time: 03/27/25 1135    Procedure: L2-3, L3-4 LAMINECTOMY (Spine Lumbar) - ARUN  APPROVED A 12:30 START ON 03-  AXIS TABLE, NEW MICROSCOPE, NEW C-ARM, MISONIX BONE SCALPAL, PNEUMATIC KERRISONS, LUMBAR BRACE, BILLINGSLEY EQUIPMENT  DIABETIC, 3++, 6'8\"    Location: [facility] / [facility]    Surgeons: Dev Godinez MD            Relevant Problems   Cardiac   (+) Hyperlipidemia LDL goal <100      Pulmonary   (+) RUTH (obstructive sleep apnea)      Neuro   (+) Back pain of lumbar region with sciatica   (+) Diabetic polyneuropathy associated with type 2 diabetes mellitus (Multi)      Endocrine   (+) Diabetic polyneuropathy associated with type 2 diabetes mellitus (Multi)   (+) Type 2 diabetes mellitus with other diabetic kidney complication      Musculoskeletal   (+) Spinal stenosis, lumbar region without neurogenic claudication      HEENT   (+) Mixed hearing loss of right ear       Clinical information reviewed:   Tobacco  Allergies  Meds   Med Hx  Surg Hx   Fam Hx  Soc Hx        NPO Detail:  No data recorded     Physical Exam    Airway  Mallampati: II     Cardiovascular   Rhythm: regular  Rate: normal     Dental    Pulmonary - normal exam     Abdominal - normal exam             Anesthesia Plan    History of general anesthesia?: yes  History of complications of general anesthesia?: no    ASA 3     general     intravenous induction   Postoperative administration of opioids is intended.  Anesthetic plan and risks discussed with patient.      "

## 2025-03-27 NOTE — CONSULTS
Vancomycin Dosing by Pharmacy- INITIAL    Brando GILMA Barrett is a 69 y.o. year old male who Pharmacy has been consulted for vancomycin dosing for surgical prophylaxis. Based on the patient's indication and renal status this patient will be dosed based on a goal AUC of 400-600.     Renal function is currently no new labs, latest scr=1.37 on  25.    Visit Vitals  /74   Pulse 54   Temp 36.4 °C (97.5 °F) (Temporal)   Resp 10        Lab Results   Component Value Date    CREATININE 1.37 (H) 2025    CREATININE 1.33 (H) 2025    CREATININE 1.37 (H) 2024    CREATININE 1.56 (H) 2024        Patient weight is as follows:   Vitals:    25 1010   Weight: 142 kg (312 lb 6.3 oz)       Cultures:  No results found for the encounter in last 14 days.        No intake/output data recorded.  I/O during current shift:  I/O this shift:  In: 1750 [I.V.:1400; IV Piggyback:350]  Out: 550 [Urine:400; Blood:150]    Temp (24hrs), Av.4 °C (97.5 °F), Min:36.4 °C (97.5 °F), Max:36.4 °C (97.5 °F)         Assessment/Plan     Patient will not be given a loading dose.  Will initiate vancomycin maintenance,  1250 mg every 12 hours.    This dosing regimen is predicted by [employer] to result in the following pharmacokinetic parameters:    Regimen: 1250 mg IV every 12 hours.  Start time: 03:56 on 2025  Exposure target: AUC24 (range)400-600 mg/L.hr   GFV52-12: 456 mg/L.hr  AUC24,ss: 611 mg/L.hr  Probability of AUC24 > 400: 89 %  Ctrough,ss: 21.5 mg/L  Probability of Ctrough,ss > 20: 57 %    No Follow-up level will be ordered unless continued or unless clinically indicated. (Ordered X 2 doses post op)  Will continue to monitor renal function daily while on vancomycin and order serum creatinine at least every 48 hours if not already ordered.  Follow for continued vancomycin needs, clinical response, and signs/symptoms of toxicity.       Liseth Sanabria, PharmD

## 2025-03-27 NOTE — OP NOTE
Preoperative diagnosis: L2-3 stenosis L3-4 stenosis and right intracanal extradural benign tumor/facet cyst.  Lumbar spondylosis.    Postoperative diagnosis: Above and right L2-3 durotomy    Procedure: L2-3 and L3-4 laminectomy decompressing the L2, 3, and 4 vertebral segments.  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  Thermal ablation of the medial nerve branch supplying the facets bilaterally at L2-3 and L3-4.  Durotomy repair right L2-3    Surgeon: Dev Godinez M.D.    Asst.: Ottoniel MALDONADOThe physician assistant was present through the entire case.  Given the nature of the disease process and the procedure to be performed a skilled surgical assistant was necessary during the case.  The assistant was necessary in order to hold retractors and directly assist in the operation.  A certified scrub tech was at the back table managing instruments and supplies for the surgical case.    Anesthesia: General    Blood Loss: 300 cc    Complications: None    HPI: The patient had leg symptoms from the above described condition.  There were scheduled electively for the above-described surgery.  All of the risks, benefits, and potential complications for operative and nonoperative treatment were discussed at length with the patient.  Risks of operative intervention include, but are not limited to: Anesthesia complications, excessive blood loss, infection, damaged to uninjured structures including, but not limited to, the dural sac and nerve roots, blood clots, and lack of improvement or worsening of symptoms.  These complications could result in death, permanent disability, or need for reoperation.  The patient completely understood those risks and wished to proceed with operative intervention.    Operative implants: None    Operative procedure: The patient was wheeled from the preanesthesia care unit to the theater.  The patient was placed in supine position and all bony prominences were  well-padded.  For the entire procedure anesthesia maintainined control of the patient's head neck.  General anesthesia was induced.  The patient was then placed prone on the Rohit table.  All bony prominences were well-padded.  The head and neck were in neutral position.  The back was cleansed with alcohol and spinal needles were inserted.  X-rays were taken to confirm appropriate levels.  The back was then marked and prepped and draped in normal sterile fashion.    Timeout was performed.  Site verification marking was verified.  Appropriate antibiotic administration was confirmed.  Next, a longitudinal incision in the midline was performed over the operative levels.  Dissection was carried down with a knife through the skin.  A Bovie was then used to dissect down to identify the spinous process of the operative levels.  Bovie was used to move the soft tissues off the spinous process lamina and facet bilaterally.  Clamps were then placed in the spinous processes and x-rays were taken confirm the appropriate levels.  Bovie and FloSeal was used to maintain hemostasis. The Bovie was used to perform thermal ablation of the medial nerve branch at each operative level facet bilaterally using direct visualization in the anatomic area of the location of the medial nerve branch.      A rongeur was then used to remove the spinous processes and debulk the lamina of the operative levels.  Microscope was then brought in for use. Next, the bur was used to debulk the lamina at the operative levels. This exposed ligamentum flavum along the midline. 60 curved curette was then used to reach underneath the lamina in the most cephalad portion of the dissection. 2 and 3 Kerrisons were used to perform midline laminectomy heading from cephalad to caudal to the most caudal level. At this point there was complete central decompression lifting off the central lamina and ligamentum flavum. The ultrasonic scalpel was then used to widen the  laminectomy, first on the patient's left side and then on the patient's right side. Bone was lifted off after the ultrasonic scalpel bone cuts were made. 2 and 3 Kerrisons were then used to clean up the lateral gutters and performed foraminotomies at all levels.  Laminectomies were performed on the left at L2-3 and bilaterally at L3-4.  Facet cyst was removed on the right at L2-3 which was incorporated into the ligamentum flavum.  This was done with Kerrisons.  At this time is complete decompression centrally as well as bilateral foraminally from the L2 nerve root to the L4 nerve root bilaterally.  Hemostasis was maintained using bipolar cautery and FloSeal.  During the durotomy on the right at L2-3 there was an adhesion of the dural sac to the facet.  A small linear 2 to 3 mm durotomy occurred.  This was closed with 3 interrupted Prolene sutures to a watertight closure.  There was also a very small poke hole in the lateral ventral aspect of the dural sac.  1 stitch was placed in this as well.  Valsalva was performed there was a watertight closure.  Next, a piece of DuraGen was placed over the repair sites and a thin layer of DuraSeal was placed as well.  Careful attention was placed to not cause iatrogenic stenosis with the DuraSeal.    The entire wound was irrigated with copious amounts of normal saline.  All saline was suctioned dry.  Valsalva was performed and there was no evidence of any dural leak or excessive bleeding.  Retractors were removed.  The deep fascia was closed over a drain that was not resting on the neural elements.  The fascia was closed with a running #1 Stratafix PDS suture.  The fatty layer was closed with 0 Vicryl suture.  The skin was closed with 2-0 Vicryl and staples.  A sterile dressing was applied.  There were no abnormal spinal cord monitor readings for the entire case.  The patient tolerated the procedure well and had pink and warm toes at the conclusion of the case.        This  dictation was created using voice recognition software.  If there are any questions about inaccuracies please do not hesitate to contact me.

## 2025-03-28 LAB
ANION GAP SERPL CALC-SCNC: 14 MMOL/L (ref 10–20)
BUN SERPL-MCNC: 21 MG/DL (ref 6–23)
CALCIUM SERPL-MCNC: 8.9 MG/DL (ref 8.6–10.3)
CHLORIDE SERPL-SCNC: 99 MMOL/L (ref 98–107)
CO2 SERPL-SCNC: 23 MMOL/L (ref 21–32)
CREAT SERPL-MCNC: 1.36 MG/DL (ref 0.5–1.3)
EGFRCR SERPLBLD CKD-EPI 2021: 56 ML/MIN/1.73M*2
ERYTHROCYTE [DISTWIDTH] IN BLOOD BY AUTOMATED COUNT: 12.1 % (ref 11.5–14.5)
GLUCOSE BLD MANUAL STRIP-MCNC: 255 MG/DL (ref 74–99)
GLUCOSE BLD MANUAL STRIP-MCNC: 255 MG/DL (ref 74–99)
GLUCOSE BLD MANUAL STRIP-MCNC: 271 MG/DL (ref 74–99)
GLUCOSE BLD MANUAL STRIP-MCNC: 281 MG/DL (ref 74–99)
GLUCOSE SERPL-MCNC: 292 MG/DL (ref 74–99)
HCT VFR BLD AUTO: 37.3 % (ref 41–52)
HGB BLD-MCNC: 12.8 G/DL (ref 13.5–17.5)
MCH RBC QN AUTO: 30 PG (ref 26–34)
MCHC RBC AUTO-ENTMCNC: 34.3 G/DL (ref 32–36)
MCV RBC AUTO: 88 FL (ref 80–100)
NRBC BLD-RTO: 0 /100 WBCS (ref 0–0)
PLATELET # BLD AUTO: 105 X10*3/UL (ref 150–450)
POTASSIUM SERPL-SCNC: 5 MMOL/L (ref 3.5–5.3)
RBC # BLD AUTO: 4.26 X10*6/UL (ref 4.5–5.9)
SODIUM SERPL-SCNC: 131 MMOL/L (ref 136–145)
WBC # BLD AUTO: 7.3 X10*3/UL (ref 4.4–11.3)

## 2025-03-28 PROCEDURE — 36415 COLL VENOUS BLD VENIPUNCTURE: CPT | Performed by: PHYSICIAN ASSISTANT

## 2025-03-28 PROCEDURE — 2500000004 HC RX 250 GENERAL PHARMACY W/ HCPCS (ALT 636 FOR OP/ED): Performed by: PHYSICIAN ASSISTANT

## 2025-03-28 PROCEDURE — 2500000004 HC RX 250 GENERAL PHARMACY W/ HCPCS (ALT 636 FOR OP/ED)

## 2025-03-28 PROCEDURE — 82374 ASSAY BLOOD CARBON DIOXIDE: CPT | Performed by: PHYSICIAN ASSISTANT

## 2025-03-28 PROCEDURE — 97161 PT EVAL LOW COMPLEX 20 MIN: CPT | Mod: GP

## 2025-03-28 PROCEDURE — 2500000001 HC RX 250 WO HCPCS SELF ADMINISTERED DRUGS (ALT 637 FOR MEDICARE OP): Performed by: NURSE PRACTITIONER

## 2025-03-28 PROCEDURE — 1100000001 HC PRIVATE ROOM DAILY

## 2025-03-28 PROCEDURE — 2500000002 HC RX 250 W HCPCS SELF ADMINISTERED DRUGS (ALT 637 FOR MEDICARE OP, ALT 636 FOR OP/ED): Performed by: REGISTERED NURSE

## 2025-03-28 PROCEDURE — 97165 OT EVAL LOW COMPLEX 30 MIN: CPT | Mod: GO

## 2025-03-28 PROCEDURE — 2500000001 HC RX 250 WO HCPCS SELF ADMINISTERED DRUGS (ALT 637 FOR MEDICARE OP): Performed by: PHYSICIAN ASSISTANT

## 2025-03-28 PROCEDURE — 2500000002 HC RX 250 W HCPCS SELF ADMINISTERED DRUGS (ALT 637 FOR MEDICARE OP, ALT 636 FOR OP/ED): Performed by: NURSE PRACTITIONER

## 2025-03-28 PROCEDURE — 82947 ASSAY GLUCOSE BLOOD QUANT: CPT

## 2025-03-28 PROCEDURE — 85027 COMPLETE CBC AUTOMATED: CPT | Performed by: PHYSICIAN ASSISTANT

## 2025-03-28 RX ORDER — OXYCODONE HYDROCHLORIDE 5 MG/1
5 TABLET ORAL EVERY 4 HOURS PRN
Qty: 28 TABLET | Refills: 0 | OUTPATIENT
Start: 2025-03-28

## 2025-03-28 RX ORDER — CEFAZOLIN SODIUM 2 G/100ML
2 INJECTION, SOLUTION INTRAVENOUS EVERY 8 HOURS
Status: DISCONTINUED | OUTPATIENT
Start: 2025-03-28 | End: 2025-03-29 | Stop reason: HOSPADM

## 2025-03-28 RX ORDER — ONDANSETRON 4 MG/1
4 TABLET, FILM COATED ORAL EVERY 8 HOURS PRN
Qty: 20 TABLET | Refills: 0 | OUTPATIENT
Start: 2025-03-28

## 2025-03-28 RX ORDER — CYCLOBENZAPRINE HCL 10 MG
10 TABLET ORAL 3 TIMES DAILY PRN
Qty: 21 TABLET | Refills: 0 | OUTPATIENT
Start: 2025-03-28

## 2025-03-28 RX ORDER — CEFAZOLIN SODIUM 2 G/50ML
2 SOLUTION INTRAVENOUS EVERY 8 HOURS
Status: DISCONTINUED | OUTPATIENT
Start: 2025-03-28 | End: 2025-03-28

## 2025-03-28 RX ADMIN — INSULIN LISPRO 6 UNITS: 100 INJECTION, SOLUTION INTRAVENOUS; SUBCUTANEOUS at 08:29

## 2025-03-28 RX ADMIN — ACETAMINOPHEN 650 MG: 325 TABLET ORAL at 00:18

## 2025-03-28 RX ADMIN — INSULIN LISPRO 6 UNITS: 100 INJECTION, SOLUTION INTRAVENOUS; SUBCUTANEOUS at 20:43

## 2025-03-28 RX ADMIN — LISINOPRIL 5 MG: 5 TABLET ORAL at 08:29

## 2025-03-28 RX ADMIN — SODIUM CHLORIDE 100 ML/HR: 9 INJECTION, SOLUTION INTRAVENOUS at 06:42

## 2025-03-28 RX ADMIN — ACETAMINOPHEN 650 MG: 325 TABLET ORAL at 06:36

## 2025-03-28 RX ADMIN — INSULIN LISPRO 6 UNITS: 100 INJECTION, SOLUTION INTRAVENOUS; SUBCUTANEOUS at 13:05

## 2025-03-28 RX ADMIN — ALLOPURINOL 300 MG: 300 TABLET ORAL at 06:36

## 2025-03-28 RX ADMIN — ACETAMINOPHEN 650 MG: 325 TABLET ORAL at 18:32

## 2025-03-28 RX ADMIN — CEFAZOLIN SODIUM 3 G: 3 INJECTION, SOLUTION INTRAVENOUS at 06:40

## 2025-03-28 RX ADMIN — ROSUVASTATIN CALCIUM 40 MG: 20 TABLET, FILM COATED ORAL at 06:36

## 2025-03-28 RX ADMIN — ACETAMINOPHEN 650 MG: 325 TABLET ORAL at 13:05

## 2025-03-28 RX ADMIN — VANCOMYCIN HYDROCHLORIDE 1250 MG: 1.25 INJECTION, POWDER, LYOPHILIZED, FOR SOLUTION INTRAVENOUS at 10:08

## 2025-03-28 RX ADMIN — CEFAZOLIN SODIUM 2 G: 2 INJECTION, SOLUTION INTRAVENOUS at 17:59

## 2025-03-28 SDOH — SOCIAL STABILITY: SOCIAL INSECURITY: DO YOU FEEL ANYONE HAS EXPLOITED OR TAKEN ADVANTAGE OF YOU FINANCIALLY OR OF YOUR PERSONAL PROPERTY?: NO

## 2025-03-28 SDOH — SOCIAL STABILITY: SOCIAL INSECURITY: WITHIN THE LAST YEAR, HAVE YOU BEEN AFRAID OF YOUR PARTNER OR EX-PARTNER?: NO

## 2025-03-28 SDOH — SOCIAL STABILITY: SOCIAL INSECURITY: ABUSE: ADULT

## 2025-03-28 SDOH — ECONOMIC STABILITY: HOUSING INSECURITY: IN THE LAST 12 MONTHS, WAS THERE A TIME WHEN YOU WERE NOT ABLE TO PAY THE MORTGAGE OR RENT ON TIME?: NO

## 2025-03-28 SDOH — SOCIAL STABILITY: SOCIAL INSECURITY: HAS ANYONE EVER THREATENED TO HURT YOUR FAMILY OR YOUR PETS?: NO

## 2025-03-28 SDOH — SOCIAL STABILITY: SOCIAL INSECURITY: WITHIN THE LAST YEAR, HAVE YOU BEEN HUMILIATED OR EMOTIONALLY ABUSED IN OTHER WAYS BY YOUR PARTNER OR EX-PARTNER?: NO

## 2025-03-28 SDOH — ECONOMIC STABILITY: FOOD INSECURITY: HOW HARD IS IT FOR YOU TO PAY FOR THE VERY BASICS LIKE FOOD, HOUSING, MEDICAL CARE, AND HEATING?: NOT HARD AT ALL

## 2025-03-28 SDOH — SOCIAL STABILITY: SOCIAL INSECURITY
WITHIN THE LAST YEAR, HAVE YOU BEEN KICKED, HIT, SLAPPED, OR OTHERWISE PHYSICALLY HURT BY YOUR PARTNER OR EX-PARTNER?: NO

## 2025-03-28 SDOH — ECONOMIC STABILITY: TRANSPORTATION INSECURITY: IN THE PAST 12 MONTHS, HAS LACK OF TRANSPORTATION KEPT YOU FROM MEDICAL APPOINTMENTS OR FROM GETTING MEDICATIONS?: NO

## 2025-03-28 SDOH — SOCIAL STABILITY: SOCIAL INSECURITY: WERE YOU ABLE TO COMPLETE ALL THE BEHAVIORAL HEALTH SCREENINGS?: YES

## 2025-03-28 SDOH — SOCIAL STABILITY: SOCIAL INSECURITY
WITHIN THE LAST YEAR, HAVE YOU BEEN RAPED OR FORCED TO HAVE ANY KIND OF SEXUAL ACTIVITY BY YOUR PARTNER OR EX-PARTNER?: NO

## 2025-03-28 SDOH — ECONOMIC STABILITY: FOOD INSECURITY: WITHIN THE PAST 12 MONTHS, THE FOOD YOU BOUGHT JUST DIDN'T LAST AND YOU DIDN'T HAVE MONEY TO GET MORE.: NEVER TRUE

## 2025-03-28 SDOH — ECONOMIC STABILITY: INCOME INSECURITY: IN THE PAST 12 MONTHS HAS THE ELECTRIC, GAS, OIL, OR WATER COMPANY THREATENED TO SHUT OFF SERVICES IN YOUR HOME?: NO

## 2025-03-28 SDOH — SOCIAL STABILITY: SOCIAL INSECURITY: HAVE YOU HAD THOUGHTS OF HARMING ANYONE ELSE?: NO

## 2025-03-28 SDOH — SOCIAL STABILITY: SOCIAL INSECURITY: DO YOU FEEL UNSAFE GOING BACK TO THE PLACE WHERE YOU ARE LIVING?: NO

## 2025-03-28 SDOH — SOCIAL STABILITY: SOCIAL INSECURITY: HAVE YOU HAD ANY THOUGHTS OF HARMING ANYONE ELSE?: NO

## 2025-03-28 SDOH — SOCIAL STABILITY: SOCIAL INSECURITY: ARE THERE ANY APPARENT SIGNS OF INJURIES/BEHAVIORS THAT COULD BE RELATED TO ABUSE/NEGLECT?: NO

## 2025-03-28 SDOH — ECONOMIC STABILITY: FOOD INSECURITY: WITHIN THE PAST 12 MONTHS, YOU WORRIED THAT YOUR FOOD WOULD RUN OUT BEFORE YOU GOT THE MONEY TO BUY MORE.: NEVER TRUE

## 2025-03-28 SDOH — ECONOMIC STABILITY: HOUSING INSECURITY: AT ANY TIME IN THE PAST 12 MONTHS, WERE YOU HOMELESS OR LIVING IN A SHELTER (INCLUDING NOW)?: NO

## 2025-03-28 SDOH — SOCIAL STABILITY: SOCIAL INSECURITY: DOES ANYONE TRY TO KEEP YOU FROM HAVING/CONTACTING OTHER FRIENDS OR DOING THINGS OUTSIDE YOUR HOME?: NO

## 2025-03-28 SDOH — SOCIAL STABILITY: SOCIAL INSECURITY
ASK PARENT OR GUARDIAN: ARE THERE TIMES WHEN YOU, YOUR CHILD(REN), OR ANY MEMBER OF YOUR HOUSEHOLD FEEL UNSAFE, HARMED, OR THREATENED AROUND PERSONS WITH WHOM YOU KNOW OR LIVE?: NO

## 2025-03-28 SDOH — ECONOMIC STABILITY: HOUSING INSECURITY: IN THE PAST 12 MONTHS, HOW MANY TIMES HAVE YOU MOVED WHERE YOU WERE LIVING?: 0

## 2025-03-28 SDOH — SOCIAL STABILITY: SOCIAL INSECURITY: ARE YOU OR HAVE YOU BEEN THREATENED OR ABUSED PHYSICALLY, EMOTIONALLY, OR SEXUALLY BY ANYONE?: NO

## 2025-03-28 ASSESSMENT — PATIENT HEALTH QUESTIONNAIRE - PHQ9
SUM OF ALL RESPONSES TO PHQ9 QUESTIONS 1 & 2: 0
1. LITTLE INTEREST OR PLEASURE IN DOING THINGS: NOT AT ALL
2. FEELING DOWN, DEPRESSED OR HOPELESS: NOT AT ALL

## 2025-03-28 ASSESSMENT — PAIN SCALES - GENERAL
PAINLEVEL_OUTOF10: 2
PAINLEVEL_OUTOF10: 2
PAINLEVEL_OUTOF10: 0 - NO PAIN
PAINLEVEL_OUTOF10: 2
PAINLEVEL_OUTOF10: 2
PAINLEVEL_OUTOF10: 0 - NO PAIN

## 2025-03-28 ASSESSMENT — COGNITIVE AND FUNCTIONAL STATUS - GENERAL
MOBILITY SCORE: 17
DRESSING REGULAR LOWER BODY CLOTHING: A LITTLE
MOBILITY SCORE: 24
TOILETING: A LITTLE
MOVING TO AND FROM BED TO CHAIR: A LITTLE
STANDING UP FROM CHAIR USING ARMS: A LITTLE
HELP NEEDED FOR BATHING: A LITTLE
TURNING FROM BACK TO SIDE WHILE IN FLAT BAD: A LITTLE
DAILY ACTIVITIY SCORE: 21
MOVING FROM LYING ON BACK TO SITTING ON SIDE OF FLAT BED WITH BEDRAILS: A LITTLE
WALKING IN HOSPITAL ROOM: A LITTLE
CLIMB 3 TO 5 STEPS WITH RAILING: A LOT
DAILY ACTIVITIY SCORE: 24

## 2025-03-28 ASSESSMENT — ACTIVITIES OF DAILY LIVING (ADL)
BATHING_ASSISTANCE: MINIMAL
LACK_OF_TRANSPORTATION: NO
LACK_OF_TRANSPORTATION: NO

## 2025-03-28 ASSESSMENT — PAIN - FUNCTIONAL ASSESSMENT
PAIN_FUNCTIONAL_ASSESSMENT: 0-10

## 2025-03-28 ASSESSMENT — LIFESTYLE VARIABLES
HOW OFTEN DO YOU HAVE A DRINK CONTAINING ALCOHOL: NEVER
SKIP TO QUESTIONS 9-10: 1
HOW OFTEN DO YOU HAVE 6 OR MORE DRINKS ON ONE OCCASION: NEVER
AUDIT-C TOTAL SCORE: 0
HOW MANY STANDARD DRINKS CONTAINING ALCOHOL DO YOU HAVE ON A TYPICAL DAY: PATIENT DOES NOT DRINK
AUDIT-C TOTAL SCORE: 0

## 2025-03-28 NOTE — CARE PLAN
The patient's goals for the shift include      The clinical goals for the shift include       Problem: Pain  Goal: Takes deep breaths with improved pain control throughout the shift  Outcome: Progressing

## 2025-03-28 NOTE — PROGRESS NOTES
Occupational Therapy    Evaluation    Patient Name: Brando Barrett  MRN: [id number]  Department: [facility]  Room: [address] [place] [address]  Today's Date: 3/28/2025  Time Calculation  Start Time: 1305  Stop Time: 1330  Time Calculation (min): 25 min        Assessment:  OT Assessment: Pt. presents s/p back surgery. Due to spinal precautions and TLSO brace, pt. requires education and assistance with ADLs, safe mobility., and functional transfers. Would benefit from continued OT to address for safety and independence with ADLs and mobility.  Prognosis: Good  Barriers to Discharge Home: No anticipated barriers  Evaluation/Treatment Tolerance: Patient tolerated treatment well  End of Session Communication: Bedside nurse  End of Session Patient Position: Up in chair, Alarm on (pillows placed underneath buttocks to raise height of chair fo pt. stature.)  OT Assessment Results: Decreased ADL status, Decreased functional mobility  Prognosis: Good  Evaluation/Treatment Tolerance: Patient tolerated treatment well  Plan:  Treatment Interventions: ADL retraining, Functional transfer training, Compensatory technique education  OT Frequency: 3 times per week  OT Discharge Recommendations: Low intensity level of continued care  OT - OK to Discharge: Yes (when medically stable/cleared.)    Subjective   Current Problem:  1. Back pain of lumbar region with sciatica        2. Spinal stenosis, lumbar region without neurogenic claudication          General:  General  Reason for Referral: ADL impairment  Referred By: Rogelio CRAIN 3/27  Past Medical History Relevant to Rehab: HLD, DM, RUTH, hearing loss, spinal stenosis  Family/Caregiver Present: No  Co-Treatment: PT  Co-Treatment Reason: to maximize pt. safety  Prior to Session Communication: Bedside nurse  Patient Position Received: Bed, 3 rail up, Alarm off, not on at start of session  General Comment: Pt. is 68 y/o male s/p L2-3 and L3-4 laminectomy decompressing the L2, 3, and 4 vertebral segments.  L2-3  right removal of intracanal extradural benign tumor/facet cyst.  Thermal ablation of the medial nerve branch supplying the facets bilaterally at L2-3 and L3-4.  Durotomy repair right L2-3  Precautions:  Medical Precautions: Spinal precautions  Post-Surgical Precautions:  (Pt. laying flat x24 hours after procedure. able to participate in therapy after noon this date. Pt. seen 13:05)  Braces Applied: TLSO  Precautions Comment: WBAT    Pain:  Pain Assessment  Pain Assessment: 0-10  0-10 (Numeric) Pain Score: 2  Pain Type: Surgical pain  Pain Location: Back  Pain Interventions: Repositioned (movement)  Response to Interventions: Content/relaxed    Objective   Cognition:  Overall Cognitive Status: Within Functional Limits  Orientation Level: Oriented X4    Home Living:  Home Living Comments: Pt. lives alone in one story house. Has 3-4 steps to enter; no HR. Has tub shower, no equipment. Laundry in the basement.  Prior Function:  Prior Function Comments: Prior to sx, pt. independent with ADLs, IADLs, no falls. Drives. Retired. No AD use for ambulation, but pt. states when he has a gout flare up he uses crutches.    ADL:  Eating Assistance: Independent  Grooming Assistance: Independent  Bathing Assistance: Minimal  UE Dressing Assistance: Independent  LE Dressing Assistance: Minimal  Toileting Assistance with Device: Minimal  Activity Tolerance:  Endurance: Endurance does not limit participation in activity  Bed Mobility/Transfers: Bed Mobility  Bed Mobility: Yes  Bed Mobility 1  Bed Mobility 1: Supine to sitting  Level of Assistance 1: Minimum assistance  Bed Mobility Comments 1: log roll to L side. head of bed flat/~15 deg elevated. Min A for LEs off edge of bed.    Transfers  Transfer: Yes  Transfer 1  Technique 1: Sit to stand  Transfer Device 1: Walker  Trials/Comments 1: bariartic WW; CGA. Benefits from elevated surfaces due to pt. height and stature as well as maintaining spinal precautions.      Functional  Mobility:  Functional Mobility  Functional Mobility Performed:  (Bariatric WW use. >100 feet with CGA for safety)    Standing Balance:  Dynamic Standing Balance  Dynamic Standing-Comments: Sj r+     Sensation:  Sensation Comment: denies numbness/tingling/changes in sensation.  Strength:  Strength Comments: Tito WNL. Not formally assess due to acuity of back sx.    Coordination:  Movements are Fluid and Coordinated: Yes  Coordination Comment: WNL   Hand Function:  Gross Grasp: Functional  Extremities: RUE   RUE : Within Functional Limits and LUE   LUE: Within Functional Limits    Outcome Measures:[facility] Daily Activity  Putting on and taking off regular lower body clothing: A little  Bathing (including washing, rinsing, drying): A little  Putting on and taking off regular upper body clothing: None  Toileting, which includes using toilet, bedpan or urinal: A little  Taking care of personal grooming such as brushing teeth: None  Eating Meals: None  Daily Activity - Total Score: 21    Education Documentation  Body Mechanics, taught by Poly Benavides OT at 3/28/2025  2:40 PM.  Learner: Patient  Readiness: Acceptance  Method: Explanation, Demonstration  Response: Verbalizes Understanding, Needs Reinforcement    Precautions, taught by Poly Benavides OT at 3/28/2025  2:40 PM.  Learner: Patient  Readiness: Acceptance  Method: Explanation, Demonstration  Response: Verbalizes Understanding, Needs Reinforcement    ADL Training, taught by Poly Benavides OT at 3/28/2025  2:39 PM.  Learner: Patient  Readiness: Acceptance  Method: Explanation, Demonstration  Response: Verbalizes Understanding, Needs Reinforcement  Comment: educated and demonstrated LB dressing with use of reacher, educated on compensatory methods and alternative means for foot wear due to BLT precautions. Pt states that he owns reachers.    IP EDUCATION:  Education  Individual(s) Educated: Patient  Education Provided: Diagnosis &  Precautions, Fall precautons, Risk and benefits of OT discussed with patient or other, POC discussed and agreed upon  Plan of Care Discussed and Agreed Upon: yes    Goals:  Encounter Problems       Encounter Problems (Active)       OT Goals       Mod I for all functional transfers  (Progressing)       Start:  03/28/25    Expected End:  04/11/25            Mod I for toileting tasks and clothing mgmt  (Progressing)       Start:  03/28/25    Expected End:  04/11/25            Mod I for Lb dressing with reacher/sock aid  (Progressing)       Start:  03/28/25    Expected End:  04/11/25

## 2025-03-28 NOTE — SIGNIFICANT EVENT
Hemodynamically stable at this time  Reviewed labs    Creatinine at baseline 1.3    Recent A1c, 6.5%, sugars have been running in the 200's, most likely due to stress. Continue diabetic diet, accu cheks and sliding scale    Patient will resume Ozempic when discharged and has Tresiba as needed    Home meds resumed, vital signs stable    Medicine to Sign off  Please call if acute needs or requested assessment is needed    Diana Egan, APRN-CNP    Plan of care was discussed extensively with patient.  Patient verbalized understanding through teach back method.  All question and concerns addressed upon examination.    Of note, this documentation is completed using the Dragon Dictation system (voice recognition software). There may be spelling and/or grammatical errors that were not corrected prior to final submission.

## 2025-03-28 NOTE — CARE PLAN
Problem: Pain  Goal: Takes deep breaths with improved pain control throughout the shift  Outcome: Progressing  Goal: Turns in bed with improved pain control throughout the shift  Outcome: Progressing  Goal: Walks with improved pain control throughout the shift  Outcome: Progressing  Goal: Performs ADL's with improved pain control throughout shift  Outcome: Progressing  Goal: Participates in PT with improved pain control throughout the shift  Outcome: Progressing  Goal: Free from opioid side effects throughout the shift  Outcome: Progressing  Goal: Free from acute confusion related to pain meds throughout the shift  Outcome: Progressing     Problem: Skin  Goal: Decreased wound size/increased tissue granulation at next dressing change  Outcome: Progressing  Goal: Participates in plan/prevention/treatment measures  Outcome: Progressing  Goal: Prevent/manage excess moisture  Outcome: Progressing  Goal: Prevent/minimize sheer/friction injuries  Outcome: Progressing  Goal: Promote/optimize nutrition  Outcome: Progressing  Goal: Promote skin healing  Outcome: Progressing

## 2025-03-28 NOTE — ANESTHESIA PROCEDURE NOTES
Peripheral Block    Patient location during procedure: pre-op  Start time: 3/27/2025 11:30 AM  End time: 3/27/2025 11:40 AM  Reason for block: at surgeon's request and post-op pain management  Staffing  Performed: attending   Authorized by: Bashir Hall DO    Performed by: Bashir Hall DO  Preanesthetic Checklist  Completed: patient identified, IV checked, site marked, risks and benefits discussed, surgical consent, monitors and equipment checked, pre-op evaluation and timeout performed   Timeout performed at:   Peripheral Block  Patient position: laying flat  Prep: ChloraPrep  Patient monitoring: heart rate, continuous pulse ox and cardiac monitor  Block type: SURAJ  Laterality: B/L  Injection technique: single-shot  Guidance: ultrasound guided  Local infiltration: lidocaine  Infiltration strength: 1 %  Dose: 2 mL  Needle  Needle gauge: 21 G  Needle length: 10 cm  Needle localization: ultrasound guidance     image stored in chart  Assessment  Injection assessment: negative aspiration for heme, no paresthesia on injection, incremental injection and local visualized surrounding nerve on ultrasound  Heart rate change: no  Additional Notes  Time out performed. 40 ml .25 % Bupivacaine used in divided doses. Patient tolerated procedure well.

## 2025-03-28 NOTE — PROGRESS NOTES
03/28/25 1548   Discharge Planning   Assistance Needed PTA,  IND with ADLS and IADLS no AD, Drives, denies falls. Pt owns crutches, tub shower- no bathroom DME,   will need a FWW   Type of Residence Private residence  (1 level home + basement (laundry))   Number of Stairs to Enter Residence 3  (no handrail)   Number of Stairs Within Residence 13   Do you have animals or pets at home? Yes   Type of Animals or Pets 1 Cat   Home or Post Acute Services None   Expected Discharge Disposition Home   Does the patient need discharge transport arranged? No   Financial Resource Strain   How hard is it for you to pay for the very basics like food, housing, medical care, and heating? Not hard   Housing Stability   In the last 12 months, was there a time when you were not able to pay the mortgage or rent on time? N   In the past 12 months, how many times have you moved where you were living? 0   At any time in the past 12 months, were you homeless or living in a shelter (including now)? N   Transportation Needs   In the past 12 months, has lack of transportation kept you from medical appointments or from getting medications? no   In the past 12 months, has lack of transportation kept you from meetings, work, or from getting things needed for daily living? No   Stroke Family Assessment   Stroke Family Assessment Needed No   Intensity of Service   Intensity of Service 0-30 min     Pt is s/p POD #1  Elective L2-3 and L3-4 laminectomy decompressing the L2, 3, and 4 vertebral segments.  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  Thermal ablation of the medial nerve branch supplying the facets bilaterally at L2-3 and L3-4.  Durotomy repair right L2-3 on 3/27/25 with Dr. Godinez. Due to pts intra-op durotomy, patient remained flat until noon today 3/28, pt Danielle to DC at 6 PM today, still has 2 KELLI drains. [facility] scores are PT (17) OT (21) recommending continued therapy at low level/intensity. No DC today,  possible DC Saturday 3/29. Pt discharge preference home vs HHC depending on progress. CT team will monitor case progression for DC planning.

## 2025-03-28 NOTE — ADDENDUM NOTE
Addendum  created 03/28/25 0802 by Bashir Hall, DO    Child order released for a procedure order, Clinical Note Signed, Intraprocedure Blocks edited, SmartForm saved

## 2025-03-28 NOTE — PROGRESS NOTES
Spine surgery    Progress Note    Subjective:     Post-Operative Day # 1   Status Post  L2-3 and L3-4 Decompessive Laminectomy      Systemic or Specific Complaints: No Complaints    Objective:     Visit Vitals  BP (!) 181/88 (BP Location: Left arm, Patient Position: Lying) Comment: hospitalist notoied   Pulse 52   Temp 36.6 °C (97.9 °F)   Resp 18       General: alert and oriented, in no acute distress, appears stated age, and cooperative   Wound: CDI   Motion: Limited due to pain   DVT Exam: No evidence of DVT seen on physical exam.       NVI in lower extremity.   Strong equal dorsi/plantar flexion bilaterally. Good distal pulses bilaterally.    KELLI Drains with 60 and 13cc    Data Review  Recent Results (from the past 24 hours)   POCT GLUCOSE    Collection Time: 03/27/25 10:57 AM   Result Value Ref Range    POCT Glucose 163 (H) 74 - 99 mg/dL   POCT GLUCOSE    Collection Time: 03/27/25  5:58 PM   Result Value Ref Range    POCT Glucose 243 (H) 74 - 99 mg/dL   POCT GLUCOSE    Collection Time: 03/27/25  6:17 PM   Result Value Ref Range    POCT Glucose 235 (H) 74 - 99 mg/dL   POCT GLUCOSE    Collection Time: 03/27/25  9:03 PM   Result Value Ref Range    POCT Glucose 254 (H) 74 - 99 mg/dL   CBC    Collection Time: 03/28/25  4:49 AM   Result Value Ref Range    WBC 7.3 4.4 - 11.3 x10*3/uL    nRBC 0.0 0.0 - 0.0 /100 WBCs    RBC 4.26 (L) 4.50 - 5.90 x10*6/uL    Hemoglobin 12.8 (L) 13.5 - 17.5 g/dL    Hematocrit 37.3 (L) 41.0 - 52.0 %    MCV 88 80 - 100 fL    MCH 30.0 26.0 - 34.0 pg    MCHC 34.3 32.0 - 36.0 g/dL    RDW 12.1 11.5 - 14.5 %    Platelets 105 (L) 150 - 450 x10*3/uL   Basic metabolic panel    Collection Time: 03/28/25  4:49 AM   Result Value Ref Range    Glucose 292 (H) 74 - 99 mg/dL    Sodium 131 (L) 136 - 145 mmol/L    Potassium 5.0 3.5 - 5.3 mmol/L    Chloride 99 98 - 107 mmol/L    Bicarbonate 23 21 - 32 mmol/L    Anion Gap 14 10 - 20 mmol/L    Urea Nitrogen 21 6 - 23 mg/dL    Creatinine 1.36 (H) 0.50 - 1.30  mg/dL    eGFR 56 (L) >60 mL/min/1.73m*2    Calcium 8.9 8.6 - 10.3 mg/dL     FL fluoro images no charge    Result Date: 3/27/2025  These images are not reportable by radiology and will not be interpreted by  Radiologists.    XR knee right 1-2 views    Result Date: 3/5/2025  Interpreted By:  Nacho Maldonado, STUDY: XR KNEE RIGHT 1-2 VIEWS, 3/5/2025 11:47 am   INDICATION: Signs/Symptoms:PAIN   ACCESSION NUMBER(S): [id number]   ORDERING CLINICIAN: NACHO MALDONADO   FINDINGS: Right knee x-rays two views AP and lateral view: No acute fractures, no dislocation. Tricompartmental degenerative joint disease, most pronounced at the medial compartment with significant joint space narrowing. Right knee joint effusion.     Signed by: Nacho Maldonado 3/5/2025 12:24 PM Dictation workstation:   [id number]    Point of Care Ultrasound    Result Date: 3/5/2025  These images are not reportable by radiology and will not be interpreted by  Radiologists.      Assessment:     Status Post L2-3 and L3-4 Decompessive Laminectomy  No acute events overnight.     Acute postoperative pain: Reports mild to moderate pain Exacerbated by movement, relieved by rest ice and pain medication. Continue current pain management.     Acute postoperative blood loss anemia: Secondary to expected surgical blood loss. Hemoglobin this A.M. 12.8.  Patient asymptomatic, and remains hemodynamically stable with no signs of active bleeding.     Leukocytosis: WBC count this morning 7.3.  No acute signs of infection.  Patient afebrile, remains hemodynamically stable denies any cough, abdominal pain, or urinary symptoms. Transient elevation in white blood count is most likely secondary to stress and inflammatory process from surgery.    Plan:      1.   Doing well POD 1    2.  Continue Deep venous thrombosis prophylaxis: Aspirin  3.  Continue physical therapy - Given intra-op durotomy, patient to remain flat until 12 noon today after which time he can participate in  PT/OT  4.  Continue Pain Control: Oxycodone  5.  Discharge planning: Await PT/OT assessment; TCC following for discharge planning.       Bertha Dickinson PA-C   3/28/2025 6:27 AM

## 2025-03-28 NOTE — PROGRESS NOTES
Physical Therapy    Physical Therapy Evaluation    Patient Name: Brando Barrett  MRN: [id number]  Today's Date: 3/28/2025   Time Calculation  Start Time: 1306  Stop Time: 1335  Time Calculation (min): 29 min  609/609-A    Assessment/Plan   PT Assessment  PT Assessment Results: Decreased strength, Decreased endurance, Impaired balance, Decreased mobility, Decreased coordination, Pain, Orthopedic restrictions  Rehab Prognosis: Good  Barriers to Discharge Home: No anticipated barriers  Evaluation/Treatment Tolerance: Patient limited by fatigue, Patient limited by pain  Strengths: Coping skills, Attitude of self, Premorbid level of function  Barriers to Participation: Comorbidities  End of Session Communication: Bedside nurse  Assessment Comment: pt with decreased mobility/gait strength balance endurance pt to benefit from skiled PT to address deficits and improve functional mobility  End of Session Patient Position: Up in chair, Alarm on  IP OR SWING BED PT PLAN  Inpatient or Swing Bed: Inpatient  PT Plan  Treatment/Interventions: Transfer training, Bed mobility, Gait training, Stair training, Balance training, Strengthening, Endurance training, Therapeutic exercise, Therapeutic activity, Positioning  PT Plan: Ongoing PT  PT Frequency: Daily  PT Discharge Recommendations: Low intensity level of continued care  Equipment Recommended upon Discharge: Wheeled walker (needs chanel walker for discharge   6'8 312lbs)  PT Recommended Transfer Status: Assist x1, Assistive device  PT - OK to Discharge: Yes (once medically ready for discharge to next level of care.)    Subjective     Current Problem:  1. Back pain of lumbar region with sciatica        2. Spinal stenosis, lumbar region without neurogenic claudication          General Visit Information:  General  Reason for Referral: weakness/ impaired mobility  Referred By: Rogelio OT/ PT 3/27  Past Medical History Relevant to Rehab: HLD, DM, RUTH, hearing loss, spinal  stenosis  Co-Treatment: OT  Co-Treatment Reason: to maximize pt. safety  Prior to Session Communication: Bedside nurse (cleared to see for therapy evals. pt off bed rest at noon .)  Patient Position Received: Bed, 3 rail up, Alarm off, not on at start of session (LSO an 2 KELLI drains)  General Comment: Pt. is 68 y/o male s/p L2-L3 and L3 -L4 decompression and laminectomy .  Home Living:  Home Living  Home Living Comments: pt lives at home alone in a 1 level home. 3 steps to enter no rail.  pt has a tub/shower no equipment, laundry in basement flight down with rail.  Prior Level of Function:  Prior Function Per Pt/Caregiver Report  Prior Function Comments: per pt IND with mobility adls and iadls.  no falls drives . has crutches   will need a FWW  Precautions:  Precautions  LE Weight Bearing Status: Weight Bearing as Tolerated  Medical Precautions: Spinal precautions (No BLT)  Braces Applied: LSO  Objective   Pain:  Pain Assessment  Pain Assessment: 0-10  0-10 (Numeric) Pain Score: 2  Pain Type: Acute pain, Surgical pain  Pain Location: Back  Cognition:  Cognition  Overall Cognitive Status: Within Functional Limits  Orientation Level: Oriented X4  General Assessments:  Activity Tolerance  Endurance: Tolerates less than 10 min exercise, no significant change in vital signs  Sensation  Sensation Comment: intact bLEs  Strength  Strength Comments: BLE 5/5     Coordination  Movements are Fluid and Coordinated: Yes  Static Sitting Balance  Static Sitting-Comment/Number of Minutes: fair  Dynamic Sitting Balance  Dynamic Sitting-Comments: fair  Static Standing Balance  Static Standing-Comment/Number of Minutes: fair  Dynamic Standing Balance  Dynamic Standing-Comments: fair  Functional Assessments:  Bed Mobility  Bed Mobility: Yes  Bed Mobility 1  Bed Mobility 1: Supine to sitting  Level of Assistance 1: Minimum assistance  Bed Mobility Comments 1: log roll to EOB  Faustino to sit up (min A to don LSO at EOB  .)  Transfers  Transfer: Yes  Transfer 1  Technique 1: Sit to stand, Stand to sit  Transfer Device 1: Walker (FWW)  Transfer Level of Assistance 1: Contact guard  Trials/Comments 1: cga with chanel walker. cues to push up an reach back  Ambulation/Gait Training  Ambulation/Gait Training Performed: Yes  Ambulation/Gait Training 1  Surface 1: Level tile  Device 1: Rolling walker (FWW chanel walker)  Assistance 1: Contact guard  Quality of Gait 1: Antalgic (slow gait speed)  Comments/Distance (ft) 1: 150ft with FWW CGA  Extremity/Trunk Assessments  RLE   RLE : Within Functional Limits  LLE   LLE : Within Functional Limits  Outcome Measures:  [facility] Basic Mobility  Turning from your back to your side while in a flat bed without using bedrails: A little  Moving from lying on your back to sitting on the side of a flat bed without using bedrails: A little  Moving to and from bed to chair (including a wheelchair): A little  Standing up from a chair using your arms (e.g. wheelchair or bedside chair): A little  To walk in hospital room: A little  Climbing 3-5 steps with railing: A lot  Basic Mobility - Total Score: 17  Goals:  Encounter Problems       Encounter Problems (Active)       PT Problem       Pt will demonstrate mod I  with bed mobility to edge of bed.   (Progressing)       Start:  03/28/25    Expected End:  04/04/25            Pt will demonstrate mod I  with sit to stand/chair transfers with FWW.   (Progressing)       Start:  03/28/25    Expected End:  04/04/25            Pt will ambulate 200 feet with FWW MOD I .   (Progressing)       Start:  03/28/25    Expected End:  04/04/25            Pt to demo 3 steps with rail with SBA ( pt uses wall at home to go up 3 steps . )  (Progressing)       Start:  03/28/25    Expected End:  04/04/25                 Education Documentation  Mobility Training, taught by Jimmie Styles, PT at 3/28/2025  2:50 PM.  Learner: Patient  Readiness: Acceptance  Method: Explanation  Response:  Verbalizes Understanding  Comment: transfers with FWW  log rolling.    Education Comments  No comments found.

## 2025-03-29 VITALS
DIASTOLIC BLOOD PRESSURE: 66 MMHG | HEART RATE: 57 BPM | TEMPERATURE: 97.5 F | WEIGHT: 312.39 LBS | HEIGHT: 78 IN | SYSTOLIC BLOOD PRESSURE: 146 MMHG | OXYGEN SATURATION: 96 % | BODY MASS INDEX: 36.14 KG/M2 | RESPIRATION RATE: 18 BRPM

## 2025-03-29 LAB
ANION GAP SERPL CALC-SCNC: 11 MMOL/L (ref 10–20)
BUN SERPL-MCNC: 26 MG/DL (ref 6–23)
CALCIUM SERPL-MCNC: 9 MG/DL (ref 8.6–10.3)
CHLORIDE SERPL-SCNC: 102 MMOL/L (ref 98–107)
CO2 SERPL-SCNC: 26 MMOL/L (ref 21–32)
CREAT SERPL-MCNC: 1.49 MG/DL (ref 0.5–1.3)
EGFRCR SERPLBLD CKD-EPI 2021: 50 ML/MIN/1.73M*2
ERYTHROCYTE [DISTWIDTH] IN BLOOD BY AUTOMATED COUNT: 12.6 % (ref 11.5–14.5)
GLUCOSE BLD MANUAL STRIP-MCNC: 178 MG/DL (ref 74–99)
GLUCOSE BLD MANUAL STRIP-MCNC: 214 MG/DL (ref 74–99)
GLUCOSE SERPL-MCNC: 178 MG/DL (ref 74–99)
HCT VFR BLD AUTO: 37.6 % (ref 41–52)
HGB BLD-MCNC: 12.5 G/DL (ref 13.5–17.5)
MCH RBC QN AUTO: 29.9 PG (ref 26–34)
MCHC RBC AUTO-ENTMCNC: 33.2 G/DL (ref 32–36)
MCV RBC AUTO: 90 FL (ref 80–100)
NRBC BLD-RTO: 0 /100 WBCS (ref 0–0)
PLATELET # BLD AUTO: 109 X10*3/UL (ref 150–450)
POTASSIUM SERPL-SCNC: 4.5 MMOL/L (ref 3.5–5.3)
RBC # BLD AUTO: 4.18 X10*6/UL (ref 4.5–5.9)
SODIUM SERPL-SCNC: 134 MMOL/L (ref 136–145)
WBC # BLD AUTO: 7.4 X10*3/UL (ref 4.4–11.3)

## 2025-03-29 PROCEDURE — 80048 BASIC METABOLIC PNL TOTAL CA: CPT | Performed by: PHYSICIAN ASSISTANT

## 2025-03-29 PROCEDURE — 2500000004 HC RX 250 GENERAL PHARMACY W/ HCPCS (ALT 636 FOR OP/ED)

## 2025-03-29 PROCEDURE — 2500000004 HC RX 250 GENERAL PHARMACY W/ HCPCS (ALT 636 FOR OP/ED): Performed by: PHYSICIAN ASSISTANT

## 2025-03-29 PROCEDURE — 97530 THERAPEUTIC ACTIVITIES: CPT | Mod: GP,CQ

## 2025-03-29 PROCEDURE — 2500000002 HC RX 250 W HCPCS SELF ADMINISTERED DRUGS (ALT 637 FOR MEDICARE OP, ALT 636 FOR OP/ED): Performed by: REGISTERED NURSE

## 2025-03-29 PROCEDURE — 2500000005 HC RX 250 GENERAL PHARMACY W/O HCPCS: Performed by: PHYSICIAN ASSISTANT

## 2025-03-29 PROCEDURE — 85027 COMPLETE CBC AUTOMATED: CPT | Performed by: PHYSICIAN ASSISTANT

## 2025-03-29 PROCEDURE — 2500000002 HC RX 250 W HCPCS SELF ADMINISTERED DRUGS (ALT 637 FOR MEDICARE OP, ALT 636 FOR OP/ED): Performed by: NURSE PRACTITIONER

## 2025-03-29 PROCEDURE — 99238 HOSP IP/OBS DSCHRG MGMT 30/<: CPT | Performed by: REGISTERED NURSE

## 2025-03-29 PROCEDURE — 2500000001 HC RX 250 WO HCPCS SELF ADMINISTERED DRUGS (ALT 637 FOR MEDICARE OP): Performed by: PHYSICIAN ASSISTANT

## 2025-03-29 PROCEDURE — 97116 GAIT TRAINING THERAPY: CPT | Mod: GP,CQ

## 2025-03-29 PROCEDURE — 2500000001 HC RX 250 WO HCPCS SELF ADMINISTERED DRUGS (ALT 637 FOR MEDICARE OP): Performed by: NURSE PRACTITIONER

## 2025-03-29 PROCEDURE — 97535 SELF CARE MNGMENT TRAINING: CPT | Mod: GO,CO

## 2025-03-29 PROCEDURE — 36415 COLL VENOUS BLD VENIPUNCTURE: CPT | Performed by: PHYSICIAN ASSISTANT

## 2025-03-29 PROCEDURE — 97530 THERAPEUTIC ACTIVITIES: CPT | Mod: GO,CO

## 2025-03-29 PROCEDURE — 82947 ASSAY GLUCOSE BLOOD QUANT: CPT

## 2025-03-29 RX ORDER — DOCUSATE SODIUM 100 MG/1
100 CAPSULE, LIQUID FILLED ORAL 2 TIMES DAILY
Qty: 20 CAPSULE | Refills: 0 | Status: SHIPPED | OUTPATIENT
Start: 2025-03-29

## 2025-03-29 RX ORDER — NALOXONE HYDROCHLORIDE 4 MG/.1ML
4 SPRAY NASAL AS NEEDED
Qty: 2 EACH | Refills: 11 | Status: SHIPPED | OUTPATIENT
Start: 2025-03-29

## 2025-03-29 RX ORDER — OXYCODONE HYDROCHLORIDE 5 MG/1
5 TABLET ORAL EVERY 4 HOURS PRN
Qty: 20 TABLET | Refills: 0 | Status: SHIPPED | OUTPATIENT
Start: 2025-03-29 | End: 2025-04-05

## 2025-03-29 RX ORDER — CYCLOBENZAPRINE HCL 10 MG
10 TABLET ORAL 3 TIMES DAILY PRN
Qty: 15 TABLET | Refills: 0 | Status: SHIPPED | OUTPATIENT
Start: 2025-03-29 | End: 2025-04-03

## 2025-03-29 RX ORDER — NALOXONE HYDROCHLORIDE 1 MG/ML
0.2 INJECTION INTRAMUSCULAR; INTRAVENOUS; SUBCUTANEOUS EVERY 5 MIN PRN
Qty: 4 ML | Status: SHIPPED | OUTPATIENT
Start: 2025-03-29 | End: 2025-03-29 | Stop reason: HOSPADM

## 2025-03-29 RX ADMIN — ACETAMINOPHEN 650 MG: 325 TABLET ORAL at 00:04

## 2025-03-29 RX ADMIN — Medication 1 L/MIN: at 08:30

## 2025-03-29 RX ADMIN — LISINOPRIL 5 MG: 5 TABLET ORAL at 08:13

## 2025-03-29 RX ADMIN — ALLOPURINOL 300 MG: 300 TABLET ORAL at 08:20

## 2025-03-29 RX ADMIN — ROSUVASTATIN CALCIUM 40 MG: 20 TABLET, FILM COATED ORAL at 08:20

## 2025-03-29 RX ADMIN — CEFAZOLIN SODIUM 2 G: 2 INJECTION, SOLUTION INTRAVENOUS at 00:33

## 2025-03-29 RX ADMIN — CEFAZOLIN SODIUM 2 G: 2 INJECTION, SOLUTION INTRAVENOUS at 08:30

## 2025-03-29 RX ADMIN — ACETAMINOPHEN 650 MG: 325 TABLET ORAL at 11:45

## 2025-03-29 RX ADMIN — INSULIN LISPRO 2 UNITS: 100 INJECTION, SOLUTION INTRAVENOUS; SUBCUTANEOUS at 11:28

## 2025-03-29 RX ADMIN — POLYETHYLENE GLYCOL 3350 17 G: 17 POWDER, FOR SOLUTION ORAL at 08:13

## 2025-03-29 RX ADMIN — ACETAMINOPHEN 650 MG: 325 TABLET ORAL at 06:11

## 2025-03-29 RX ADMIN — INSULIN LISPRO 2 UNITS: 100 INJECTION, SOLUTION INTRAVENOUS; SUBCUTANEOUS at 08:13

## 2025-03-29 ASSESSMENT — PAIN SCALES - GENERAL
PAINLEVEL_OUTOF10: 2
PAINLEVEL_OUTOF10: 1
PAINLEVEL_OUTOF10: 0 - NO PAIN
PAINLEVEL_OUTOF10: 0 - NO PAIN
PAINLEVEL_OUTOF10: 1

## 2025-03-29 ASSESSMENT — COGNITIVE AND FUNCTIONAL STATUS - GENERAL
MOBILITY SCORE: 18
STANDING UP FROM CHAIR USING ARMS: A LITTLE
HELP NEEDED FOR BATHING: A LITTLE
DRESSING REGULAR LOWER BODY CLOTHING: A LITTLE
TOILETING: A LITTLE
WALKING IN HOSPITAL ROOM: A LITTLE
MOVING FROM LYING ON BACK TO SITTING ON SIDE OF FLAT BED WITH BEDRAILS: A LITTLE
TURNING FROM BACK TO SIDE WHILE IN FLAT BAD: A LITTLE
CLIMB 3 TO 5 STEPS WITH RAILING: A LITTLE
MOVING TO AND FROM BED TO CHAIR: A LITTLE
DRESSING REGULAR UPPER BODY CLOTHING: A LITTLE
DAILY ACTIVITIY SCORE: 19
PERSONAL GROOMING: A LITTLE

## 2025-03-29 ASSESSMENT — ACTIVITIES OF DAILY LIVING (ADL): HOME_MANAGEMENT_TIME_ENTRY: 30

## 2025-03-29 ASSESSMENT — PAIN DESCRIPTION - DESCRIPTORS: DESCRIPTORS: SORE

## 2025-03-29 ASSESSMENT — PAIN - FUNCTIONAL ASSESSMENT
PAIN_FUNCTIONAL_ASSESSMENT: 0-10
PAIN_FUNCTIONAL_ASSESSMENT: 0-10

## 2025-03-29 NOTE — CARE PLAN
The patient's goals for the shift include      The clinical goals for the shift include Pt pain will be managable throughout shift    Pt didn't complain of pain throughout shift. Patient given the scheduled dose of tylenol. Will continue to monitor.

## 2025-03-29 NOTE — DISCHARGE SUMMARY
Discharge Diagnosis  Spinal stenosis, lumbar region without neurogenic claudication    Issues Requiring Follow-Up  Follow up with Dr. Godinez in 2 weeks    Test Results Pending At Discharge  Pending Labs       No current pending labs.            Hospital Course  Patient is a 69 year old male who presented to [facility] with back pain.  Resources benefits were discussed it was ultimately decided to perform an elective laminectomy with durotomy.  Patient's procedure and hospitalization were without complications.  Patient will be DC'd today in stable condition home.     Pertinent Physical Exam At Time of Discharge  Physical Exam  See progress note from provider today    Home Medications     Medication List      START taking these medications     cyclobenzaprine 10 mg tablet; Commonly known as: Flexeril; Take 1 tablet   (10 mg) by mouth 3 times a day as needed for muscle spasms for up to 5   days.   docusate sodium 100 mg capsule; Commonly known as: Colace; Take 1   capsule (100 mg) by mouth 2 times a day.   naloxone 1 mg/mL injection; Commonly known as: Narcan; Infuse 0.2 mL   (0.2 mg) into a venous catheter every 5 minutes if needed for respiratory   depression. May repeat every 2-3 minutes as needed until medical   assistance available.   oxyCODONE 5 mg immediate release tablet; Commonly known as: Roxicodone;   Take 1 tablet (5 mg) by mouth every 4 hours if needed for moderate pain (4   - 6) or severe pain (7 - 10) for up to 7 days.     CONTINUE taking these medications     allopurinol 300 mg tablet; Commonly known as: Zyloprim   chlorhexidine 0.12 % solution; Commonly known as: Peridex   fluticasone 50 mcg/actuation nasal spray; Commonly known as: Flonase   FreeStyle Demetria 3 Plus Sensor device; Generic drug: blood-glucose sensor   lisinopril 5 mg tablet   Ozempic 0.25 mg or 0.5 mg (2 mg/3 mL) pen injector; Generic drug:   semaglutide   rosuvastatin 40 mg tablet; Commonly known as: Crestor   Tresiba  FlexTouch U-100 100 unit/mL (3 mL) pen; Generic drug: insulin   degludec       Outpatient Follow-Up  Future Appointments   Date Time Provider Department Center   7/25/2025 11:30 AM Arsh Posey MD [id number] [place]     Time spent 20 minutes obtaining labs, imaging, recommendations, interview, assessment, examination, medication review/ordering, and EMR review.    Plan of care was discussed extensively with patient. Patient verbalized understanding through teach back method. All questions and concerns addressed upon examination.     Of note, this documentation is completed using the Dragon Dictation system (voice recognition software). There may be spelling and/or grammatical errors that were not corrected prior to final submission.       Dee Vega, CRISTIAN-CNP

## 2025-03-29 NOTE — PROGRESS NOTES
03/29/25 1246   Discharge Planning   Home or Post Acute Services None   Expected Discharge Disposition Home     KELLI drains removed this morning. Pt states he has 2 friends that are retired nurses that will be assisting him at once home. No HHC needed as pt is not cleared for home therapy yet. HOME no needs at [place].

## 2025-03-29 NOTE — PROGRESS NOTES
Physical Therapy    Physical Therapy Treatment    Patient Name: Brando Barrett  MRN: [id number]  Today's Date: 3/29/2025  Time Calculation  Start Time: 0927  Stop Time: 1010  Time Calculation (min): 43 min     609/609-A    Assessment/Plan   PT Assessment  PT Assessment Results: Decreased strength, Decreased endurance, Decreased mobility, Decreased coordination, Pain, Orthopedic restrictions  Rehab Prognosis: Good  Barriers to Discharge Home: No anticipated barriers  Evaluation/Treatment Tolerance: Patient tolerated treatment well  Strengths: Coping skills, Attitude of self, Premorbid level of function  Barriers to Participation: Comorbidities  End of Session Communication: Bedside nurse  Assessment Comment: pt would benefit from continued therapy to improve  mobility, gait , strength and  endurance in order to improve  functional mobility  End of Session Patient Position: Up in chair, Alarm on     Treatment/Interventions: Transfer training, Bed mobility, Gait training, Stair training, Balance training, Strengthening, Endurance training, Therapeutic exercise, Therapeutic activity, Positioning  PT Plan: Ongoing PT  PT Frequency: Daily  PT Discharge Recommendations: Low intensity level of continued care  Equipment Recommended upon Discharge: Wheeled walker (needs chanel walker for discharge   6'8 312lbs) PT Recommended Transfer Status: Assist x1, Assistive device    General Visit Information:   PT  Visit  PT Received On: 03/29/25  General  Reason for Referral: weakness/ impaired mobility  Past Medical History Relevant to Rehab: HLD, DM, RUTH, hearing loss, spinal stenosis  Family/Caregiver Present: No  Prior to Session Communication: Bedside nurse (cleared to participate)  Patient Position Received: Up in chair, Alarm off, not on at start of session  General Comment: agreeable to participate, states it feels good to move    General Observations:   General Observation: 2JP drains in  place    Subjective      Precautions:  Precautions  LE Weight Bearing Status: Weight Bearing as Tolerated  Medical Precautions: Spinal precautions  Braces Applied: brace  in place upom arrival  Precautions Comment: pt able to recall  and matintain spinal precautions    Vital Signs:     Objective     Pain:  Pain Assessment  Pain Assessment: 0-10  0-10 (Numeric) Pain Score: 1  Pain Type: Acute pain, Surgical pain  Pain Location: Back  Pain Descriptors: Sore  Pain Interventions: Ambulation/increased activity (pt declioned cold pack secondary to minimal pain, states he   will request one if needed)  Response to Interventions: No change in pain    Cognition:  Cognition  Overall Cognitive Status: Within Functional Limits  Orientation Level: Oriented X4    Activity Tolerance:  Activity Tolerance  Endurance: Endurance does not limit participation in activity    Treatments:  Therapeutic Exercise  Therapeutic Exercise Performed: No           Ambulation/Gait Training  Ambulation/Gait Training Performed: Yes  Ambulation/Gait Training 1  Comments/Distance (ft) 1: ambulating 150ft with Bariatric WW and SBA using slow steady, step through gait pattern.  Pt states that he  has access to a WW upon discharge (informed pt that if that  changes and  he  needs a WW we are able to dispense)  Transfers  Transfer: Yes  Transfer 1  Trials/Comments 1: transfers sit <--> stand with chanel WW and SBA using  good safety awareness  Stairs  Stairs: Yes (up and down 5 stairs 2X with single railing and hand held assist with SBA , also up and down 2X with SPC and single rail with SBA, using  step to gait pattern and  good sequencng. Pt states he has crutches at home and  may have a cane. denies concerns)       Outcome Measures:     [facility] Basic Mobility  Turning from your back to your side while in a flat bed without using bedrails: A little  Moving from lying on your back to sitting on the side of a flat bed without using bedrails: A little  Moving to and from bed to  chair (including a wheelchair): A little  Standing up from a chair using your arms (e.g. wheelchair or bedside chair): A little  To walk in hospital room: A little  Climbing 3-5 steps with railing: A little  Basic Mobility - Total Score: 18    EDUCATION:    Individual(s) Educated: Patient  Education Provided: Body Mechanics, Fall Risk, Post-Op Precautions, Home Safety  Patient Response to Education: Patient/Caregiver Verbalized Understanding of Information, Patient/Caregiver Performed Return Demonstration of Exercises/Activities, Patient/Caregiver Asked Appropriate Questions  Education Comment: addressed pt's questions about precautions and best positions for  sleep    Encounter Problems       Encounter Problems (Active)       PT Problem       Pt will demonstrate mod I  with bed mobility to edge of bed.   (Progressing)       Start:  03/28/25    Expected End:  04/04/25            Pt will demonstrate mod I  with sit to stand/chair transfers with FWW.   (Progressing)       Start:  03/28/25    Expected End:  04/04/25            Pt will ambulate 200 feet with FWW MOD I .   (Progressing)       Start:  03/28/25    Expected End:  04/04/25            Pt to demo 3 steps with rail with SBA ( pt uses wall at home to go up 3 steps . )  (Progressing)       Start:  03/28/25    Expected End:  04/04/25

## 2025-03-29 NOTE — PROGRESS NOTES
Occupational Therapy    OT Treatment    Patient Name: Brando Barrett  MRN: [id number]  Department: [facility]  Room: [address] [place] [address]  Today's Date: 3/29/2025  Time Calculation  Start Time: 0839  Stop Time: 0922  Time Calculation (min): 43 min      Assessment:  End of Session Communication: Bedside nurse, PCT/NA/CTA  End of Session Patient Position: Up in chair, Alarm off, not on at start of session     Plan:  Treatment Interventions: ADL retraining, Functional transfer training, Compensatory technique education  OT Frequency: 3 times per week  Treatment Interventions: ADL retraining, Functional transfer training, Compensatory technique education    Subjective   Previous Visit Info:  OT Last Visit  OT Received On: 03/29/25  General:  General  Reason for Referral: s/p L2-3 and L3-4 laminectomy decompressing the L2, 3, and 4 vertebral segments.  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  Thermal ablation of the medial nerve branch supplying the facets bilaterally at L2-3 and L3-4.  Durotomy repair right L2-3  Prior to Session Communication: Bedside nurse  Patient Position Received: Bed, 3 rail up, Alarm off, not on at start of session  Precautions:  LE Weight Bearing Status: Weight Bearing as Tolerated  Medical Precautions: Spinal precautions  Post-Surgical Precautions: Spinal precautions  Braces Applied: TLSO brace when up  Precautions Comment: patient able to verb 2 of 3 precautions. re-edu on precautions    Pain:  Pain Assessment  Pain Assessment: 0-10  0-10 (Numeric) Pain Score: 2  Pain Type: Surgical pain  Pain Location: Back    Objective    Cognition:  Cognition  Overall Cognitive Status: Impaired  Orientation Level: Oriented X4  Following Commands: Follows all commands and directions without difficulty  Safety Judgment: Good awareness of safety precautions  Problem Solving: Able to problem solve independently  Insight: Mild  Impulsive: Within functional limits     Activities of Daily Living:     UE Dressing  UE Dressing Level of Assistance:  (donned brace with min a. over head shirt- mod i)  UE Dressing Where Assessed: Edge of bed    LE Dressing  LE Dressing: Yes  Pants Level of Assistance:  (donned with comp tech and supervision. pants mgmt- mod i)  Sock Level of Assistance:  (donned/doffed socks with comp tech and mod I)  LE Dressing Where Assessed: Edge of bed    Toileting  Toileting Level of Assistance: Distant supervision (anterior/posterior seated)  Where Assessed: Toilet  Functional Standing Tolerance:  Functional Standing Tolerance Comments: patient stood for a total of 5 mins this date with fair/fair+ balance with 2-0 ue support during functional ambulation/transfers and ADL tasks  Bed Mobility/Transfers: Bed Mobility  Bed Mobility: Yes  Bed Mobility 1  Bed Mobility 1: Supine to sitting  Level of Assistance 1:  (supervision with HOB elevated)    Transfers  Transfer: Yes  Transfer 1  Technique 1: Sit to stand  Transfer Device 1: Walker  Transfer Level of Assistance 1:  (SBA from raised bed secondary to patients height)  Transfers 2  Transfer Device 2: Walker  Transfer Level of Assistance 2:  (SBA)  Trials/Comments 2: functional ambulation    Toilet Transfers  Toilet Transfer Type: To and from  Toilet Transfer to: Raised toilet seat without rails (with use of sink top)  Toilet Transfer Technique: Ambulating  Toilet Transfers:  (CGA/SBA)  Sitting Balance:  Dynamic Sitting Balance  Dynamic Sitting-Level of Assistance:  (good)  Standing Balance:  Dynamic Standing Balance  Dynamic Standing-Level of Assistance:  (fair/fair+)    Outcome Measures:[facility] Daily Activity  Putting on and taking off regular lower body clothing: A little  Bathing (including washing, rinsing, drying): A little  Putting on and taking off regular upper body clothing: A little  Toileting, which includes using toilet, bedpan or urinal: A little  Taking care of personal grooming such as brushing teeth: A little  Eating Meals:  None  Daily Activity - Total Score: 19    Education Documentation  Body Mechanics, taught by CITLALY Todd at 3/29/2025 12:42 PM.  Learner: Patient  Readiness: Acceptance  Method: Explanation  Response: Verbalizes Understanding    Precautions, taught by CITLALY Todd at 3/29/2025 12:42 PM.  Learner: Patient  Readiness: Acceptance  Method: Explanation  Response: Verbalizes Understanding    ADL Training, taught by CITLALY Todd at 3/29/2025 12:42 PM.  Learner: Patient  Readiness: Acceptance  Method: Explanation  Response: Verbalizes Understanding    OP EDUCATION:  Education  Individual(s) Educated: Patient  Education Provided: Diagnosis & Precautions, Symptom management, Ergonomics and postural realignment, Joint protection and energy conservation, Fall precautons, Risk and benefits of OT discussed with patient or other, POC discussed and agreed upon  Diagnosis and Precautions: spinal precautions TLSO brace on when up  Equipment:  (AE, EC tech, home DME, precautions, safety)  Patient/Caregiver Demonstrated Understanding: yes  Plan of Care Discussed and Agreed Upon: yes  Patient Response to Education: Patient/Caregiver Verbalized Understanding of Information    Goals:  Encounter Problems       Encounter Problems (Active)       OT Goals       Mod I for all functional transfers  (Progressing)       Start:  03/28/25    Expected End:  04/11/25            Mod I for toileting tasks and clothing mgmt  (Progressing)       Start:  03/28/25    Expected End:  04/11/25            Mod I for Lb dressing with reacher/sock aid  (Progressing)       Start:  03/28/25    Expected End:  04/11/25

## 2025-03-29 NOTE — PROGRESS NOTES
History of Present Illness:  Patient endorses appropriate pain this am.   The patient denies any new onset numbness, tingling or any bowel or bladder issues.  No chest pain, calf pain or shortness of breath.  Patient urinating without issue/    Physical Examination:  Vitals:    03/29/25 0821   BP:    Pulse: 55   Resp:    Temp:    SpO2:       Gross motor and sensory exams intact, no defecits  No upper motor neuron signs  Good cap refill   Neg Rebecca test    Drains 10/20 o/n    Assessment:  Patient status post laminectomy with durotomy doing well    Plan:  Analgesia  Okay for discharge  Discussed rehab goals.   DVT prophylaxis per orders  Discharge planning.

## 2025-04-16 ENCOUNTER — OFFICE VISIT (OUTPATIENT)
Dept: ORTHOPEDIC SURGERY | Facility: CLINIC | Age: 70
End: 2025-04-16
Payer: MEDICARE

## 2025-04-16 DIAGNOSIS — M54.10 BACK PAIN WITH RADICULOPATHY: Primary | ICD-10-CM

## 2025-04-16 PROCEDURE — 99211 OFF/OP EST MAY X REQ PHY/QHP: CPT | Performed by: PHYSICIAN ASSISTANT

## 2025-04-16 PROCEDURE — 3048F LDL-C <100 MG/DL: CPT | Performed by: PHYSICIAN ASSISTANT

## 2025-04-16 PROCEDURE — 1036F TOBACCO NON-USER: CPT | Performed by: PHYSICIAN ASSISTANT

## 2025-04-16 PROCEDURE — 4010F ACE/ARB THERAPY RXD/TAKEN: CPT | Performed by: PHYSICIAN ASSISTANT

## 2025-04-16 PROCEDURE — 1111F DSCHRG MED/CURRENT MED MERGE: CPT | Performed by: PHYSICIAN ASSISTANT

## 2025-04-16 PROCEDURE — 99024 POSTOP FOLLOW-UP VISIT: CPT | Performed by: PHYSICIAN ASSISTANT

## 2025-04-16 PROCEDURE — 3044F HG A1C LEVEL LT 7.0%: CPT | Performed by: PHYSICIAN ASSISTANT

## 2025-04-16 PROCEDURE — 1159F MED LIST DOCD IN RCRD: CPT | Performed by: PHYSICIAN ASSISTANT

## 2025-04-16 RX ORDER — SULFAMETHOXAZOLE AND TRIMETHOPRIM 800; 160 MG/1; MG/1
1 TABLET ORAL 2 TIMES DAILY
Qty: 20 TABLET | Refills: 0 | Status: SHIPPED | OUTPATIENT
Start: 2025-04-16 | End: 2025-04-26

## 2025-04-16 RX ORDER — CEPHALEXIN 500 MG/1
500 CAPSULE ORAL EVERY 6 HOURS
Qty: 40 CAPSULE | Refills: 0 | Status: SHIPPED | OUTPATIENT
Start: 2025-04-16 | End: 2025-04-26

## 2025-04-16 NOTE — PROGRESS NOTES
Established patient little over 2 weeks postop L2-3 L3-4 and he also had a durotomy repair at L2-3 patient comes the office he complains of no headaches.  There is no real radiculopathy to the lower extremities.  He does have a little bit of serous drainage he is complaining of at the base of incision he is borderline diabetic he states.  No bowel or bladder complaints no saddle anesthesia no fevers chills nausea vomiting or night sweats    Physical exam: Well-nourished, well kept.  No lymphangitis or lymphadenopathy in the examined extremities.  Good perfusion to the lower extremities bilaterally.  Dorsalis pedis pulses 2+.  Capillary refill to the digits brisk.  No distal edema.  Ambulating with no difficulty moving lower extremities without any difficulty motor strength intact wound is clean dry and intact with staples in place.  There is a little difference and increase in redness at the very base an incision about an inch and a half with very mild serous drainage no fluid collection on the skin not warm to touch no signs of any pus.    Plan: At this point I told the patient we are going to take the staples out in the upper part of the incision and leave the last 2 inches with staples in place we will going to put him on 2 antibiotics prophylactically and we will do a quick wound check next week if everything looks good we get the rest of the staples out.  Any change in his condition increase in pain and increase in drainage fevers or chills headaches nausea vomiting he is to notify us immediately

## 2025-04-17 ENCOUNTER — TELEPHONE (OUTPATIENT)
Dept: ORTHOPEDIC SURGERY | Facility: CLINIC | Age: 70
End: 2025-04-17
Payer: MEDICARE

## 2025-04-23 ENCOUNTER — APPOINTMENT (OUTPATIENT)
Dept: ORTHOPEDIC SURGERY | Facility: CLINIC | Age: 70
End: 2025-04-23
Payer: MEDICARE

## 2025-04-23 DIAGNOSIS — M54.10 BACK PAIN WITH RADICULOPATHY: Primary | ICD-10-CM

## 2025-04-23 PROCEDURE — 4010F ACE/ARB THERAPY RXD/TAKEN: CPT | Performed by: PHYSICIAN ASSISTANT

## 2025-04-23 PROCEDURE — 3048F LDL-C <100 MG/DL: CPT | Performed by: PHYSICIAN ASSISTANT

## 2025-04-23 PROCEDURE — 99024 POSTOP FOLLOW-UP VISIT: CPT | Performed by: PHYSICIAN ASSISTANT

## 2025-04-23 PROCEDURE — 1111F DSCHRG MED/CURRENT MED MERGE: CPT | Performed by: PHYSICIAN ASSISTANT

## 2025-04-23 PROCEDURE — 3044F HG A1C LEVEL LT 7.0%: CPT | Performed by: PHYSICIAN ASSISTANT

## 2025-04-23 NOTE — PROGRESS NOTES
Established patient postop L2-3 L3-4 laminectomy with dural repair.  He came in last week his had a little bit of drainage replace him on antibiotic.  The Bactrim made him pretty nauseated and sick so he just kept on the Keflex.  He says there is still maybe some slight drainage but not like it was.  He denies fever chills nausea vomiting night sweats denies radiculopathy denies paresthesias denies bowel or bladder complaints.  Denies any headaches blurred vision nausea vomiting    Physical exam: Well-nourished, well kept.  No lymphangitis or lymphadenopathy in the examined extremities.  Good perfusion to the lower extremities bilaterally.  Dorsalis pedis pulses 2+.  Capillary refill to the digits brisk.  No distal edema.  Ambulating with no difficulty decreased range of motion secondary to BLT restrictions wound is clean dry and intact with about 7 or 8 staples at the base of the incision there is a little redness around the staple but no active drainage so I can see no swelling no signs of any pus or infection.    Plan: Organ to get the staples out today.  I will put some Steri-Strips at the base of that incision with just a small dressing in case there is still some drainage he is wilberto finish the Keflex that we gave him him and eyeball this wound again next week to make sure it is healing properly.

## 2025-04-27 NOTE — PROGRESS NOTES
Therapy                            Cancellation/No-show Note      Date: 2023  Patient: Luh Adhikari (45 y.o. male)  : 1955  MRN:  [id number]  Referring Physician: Elmer Landaverde MD    Medical Diagnosis: Lumbosacral radiculitis [M54.17]      Visit Information:  Visits to Date 7   No Show/Cancelled Appts: 0 /       For today's appointment patient:  [x]  Cancelled  []  Rescheduled appointment  []  No-show   []  Called pt to remind of next appointment     Reason given by patient:  []  Patient ill  []  Conflicting appointment  []  No transportation    []  Conflict with work  []  No reason given  [x]  Other:  hand swelling    [x] Pt has future appointments scheduled, no follow up needed  [] Pt requests to be on hold.     Reason:   If > 2 weeks please discuss with therapist.  [] Therapist to call pt for follow up     Comments:       Signature: Electronically signed by Silvino Melendez PT on 23 at 9:58 AM EST Chest pain

## 2025-05-01 ENCOUNTER — APPOINTMENT (OUTPATIENT)
Dept: ORTHOPEDIC SURGERY | Facility: CLINIC | Age: 70
End: 2025-05-01
Payer: MEDICARE

## 2025-05-01 DIAGNOSIS — M54.10 BACK PAIN WITH RADICULOPATHY: Primary | ICD-10-CM

## 2025-05-01 PROCEDURE — 4010F ACE/ARB THERAPY RXD/TAKEN: CPT | Performed by: PHYSICIAN ASSISTANT

## 2025-05-01 PROCEDURE — 99024 POSTOP FOLLOW-UP VISIT: CPT | Performed by: PHYSICIAN ASSISTANT

## 2025-05-01 PROCEDURE — 3044F HG A1C LEVEL LT 7.0%: CPT | Performed by: PHYSICIAN ASSISTANT

## 2025-05-01 PROCEDURE — 3048F LDL-C <100 MG/DL: CPT | Performed by: PHYSICIAN ASSISTANT

## 2025-05-01 NOTE — PROGRESS NOTES
Established patient follow-up L2-3 L3-4 laminectomy with dural repair had some well wound issues still getting maybe some very slight drainage.  No fever chills nausea vomiting episodes no bowel or bladder complaints no saddle anesthesia no constant radiculopathy or paresthesia.  He finished his antibiotics.    Physical exam: Well-nourished, well kept.  No lymphangitis or lymphadenopathy in the examined extremities.  CV 2 ambulating without any major difficulty.  Wound is clean dry and intact there is just a slight discoloration at the base of the incision I do not see any active drainage today.  No signs of redness swelling or infection noted.    Plan: I will follow-up the patient 1 more week I will keep an eye on this incision.  Any change in his condition or incision he can contact me immediately.

## 2025-05-07 ENCOUNTER — HOSPITAL ENCOUNTER (OUTPATIENT)
Dept: RADIOLOGY | Facility: CLINIC | Age: 70
Discharge: HOME | End: 2025-05-07
Payer: MEDICARE

## 2025-05-07 ENCOUNTER — OFFICE VISIT (OUTPATIENT)
Dept: ORTHOPEDIC SURGERY | Facility: CLINIC | Age: 70
End: 2025-05-07
Payer: MEDICARE

## 2025-05-07 DIAGNOSIS — L76.82 POSTOPERATIVE COMPLICATION OF SKIN INVOLVING DRAINAGE FROM SURGICAL WOUND: ICD-10-CM

## 2025-05-07 DIAGNOSIS — M54.50 LUMBAR PAIN: ICD-10-CM

## 2025-05-07 PROCEDURE — 1036F TOBACCO NON-USER: CPT | Performed by: PHYSICIAN ASSISTANT

## 2025-05-07 PROCEDURE — 3048F LDL-C <100 MG/DL: CPT | Performed by: PHYSICIAN ASSISTANT

## 2025-05-07 PROCEDURE — 4010F ACE/ARB THERAPY RXD/TAKEN: CPT | Performed by: PHYSICIAN ASSISTANT

## 2025-05-07 PROCEDURE — 1159F MED LIST DOCD IN RCRD: CPT | Performed by: PHYSICIAN ASSISTANT

## 2025-05-07 PROCEDURE — 3044F HG A1C LEVEL LT 7.0%: CPT | Performed by: PHYSICIAN ASSISTANT

## 2025-05-07 PROCEDURE — 99212 OFFICE O/P EST SF 10 MIN: CPT | Performed by: PHYSICIAN ASSISTANT

## 2025-05-07 PROCEDURE — 72100 X-RAY EXAM L-S SPINE 2/3 VWS: CPT

## 2025-05-07 PROCEDURE — 99024 POSTOP FOLLOW-UP VISIT: CPT | Performed by: PHYSICIAN ASSISTANT

## 2025-05-07 RX ORDER — CEPHALEXIN 500 MG/1
500 CAPSULE ORAL 3 TIMES DAILY
Qty: 21 CAPSULE | Refills: 0 | Status: SHIPPED | OUTPATIENT
Start: 2025-05-07 | End: 2025-05-14

## 2025-05-07 NOTE — PROGRESS NOTES
Established patient follow-up L2-3 L3-4 Lami.  He also had a dural repair and she had a slow healing distal part of the incision.  With some slight drainage.  We looked at it last week it did not look too bad.  This week he says he feels like it is a little more wet.  Denies fever chills nausea vomiting night sweats denies constant radiculopathy paresthesia denies headaches blurred vision nausea vomiting.    Physical exam: Well-nourished, well kept.  No lymphangitis or lymphadenopathy in the examined extremities.  Good perfusion to the lower extremities bilaterally.  Dorsalis pedis pulses 2+.  Capillary refill to the digits brisk.  No distal edema.  Ambulating with no difficulty wound has some redness at the last inch of the incision like he had last week but it has gotten a little slight serous drainage this week.  There is no signs of any pus no swelling skin color and temperature around the incision are normal.  The rest of the wound is completely healed and intact.    Assessment: I talked to the patient wanted to have him stop wearing the LSO brace I think maybe it is rubbing it and aggravating it maybe it is affecting the healing of this.  I want to put him back on an antibiotic prophylactically because I explained to him if of any fluid is leaking out.  There is a chance of bacteria going in and out of warm to infection he is diabetic.    Plan: Will put him back on the Keflex, Bactrim made him very sick so we stopped at the last time.  He will follow-up in 1 week for a quick wound check

## 2025-05-14 ENCOUNTER — APPOINTMENT (OUTPATIENT)
Dept: ORTHOPEDIC SURGERY | Facility: CLINIC | Age: 70
End: 2025-05-14
Payer: MEDICARE

## 2025-05-14 DIAGNOSIS — M54.10 BACK PAIN WITH RADICULOPATHY: Primary | ICD-10-CM

## 2025-05-14 PROCEDURE — 1159F MED LIST DOCD IN RCRD: CPT | Performed by: PHYSICIAN ASSISTANT

## 2025-05-14 PROCEDURE — 99024 POSTOP FOLLOW-UP VISIT: CPT | Performed by: PHYSICIAN ASSISTANT

## 2025-05-14 PROCEDURE — 3048F LDL-C <100 MG/DL: CPT | Performed by: PHYSICIAN ASSISTANT

## 2025-05-14 PROCEDURE — 3044F HG A1C LEVEL LT 7.0%: CPT | Performed by: PHYSICIAN ASSISTANT

## 2025-05-14 PROCEDURE — 4010F ACE/ARB THERAPY RXD/TAKEN: CPT | Performed by: PHYSICIAN ASSISTANT

## 2025-05-14 PROCEDURE — 1036F TOBACCO NON-USER: CPT | Performed by: PHYSICIAN ASSISTANT

## 2025-05-14 NOTE — PROGRESS NOTES
Established patient postop he had an L2-3 L3-4 laminae.  He also had a dural repair.  Been having some wound issues where the base of the incisions are just still slightly draining.  Last week it was a little red so I put him back on the antibiotic and I told him to stop wearing the brace because the brace was kind of aggravating the wound.  So he has not been using that and has not been bending and twisting.  His surgery was on 3/27/2025.  Patient denies fever chills nausea vomiting night sweats no radiculopathy or paresthesia no bowel or bladder complaints no saddle anesthesia.  Patient denies any headaches nausea vomiting.    Physical exam: Well-nourished, well kept.  No lymphangitis or lymphadenopathy in the examined extremities.  Ambulating with no major difficulty.  Full range of motion to the lower extremities motor strength intact.  Wound is intact except for the last inch of the incision is just slightly red.  I do not see any active drainage at this point.  But there is a little bit of slight staining on the dressing when I removed it.  No signs of redness or swelling around the incision.    Plan: I advised the patient to continue with his antibiotics finished.  I want him to see Dr. Godinez next week to see if he has any recommendations or if he thinks maybe he thinks he needs to see our our wound team.  Will get him an appointment to see him next Tuesday.

## 2025-05-20 ENCOUNTER — OFFICE VISIT (OUTPATIENT)
Dept: ORTHOPEDIC SURGERY | Facility: CLINIC | Age: 70
End: 2025-05-20
Payer: MEDICARE

## 2025-05-20 DIAGNOSIS — M54.9 BACK PAIN WITHOUT RADICULOPATHY: Primary | ICD-10-CM

## 2025-05-20 PROCEDURE — 3048F LDL-C <100 MG/DL: CPT | Performed by: ORTHOPAEDIC SURGERY

## 2025-05-20 PROCEDURE — 99024 POSTOP FOLLOW-UP VISIT: CPT | Performed by: ORTHOPAEDIC SURGERY

## 2025-05-20 PROCEDURE — 99212 OFFICE O/P EST SF 10 MIN: CPT | Performed by: ORTHOPAEDIC SURGERY

## 2025-05-20 PROCEDURE — 4010F ACE/ARB THERAPY RXD/TAKEN: CPT | Performed by: ORTHOPAEDIC SURGERY

## 2025-05-20 PROCEDURE — 1159F MED LIST DOCD IN RCRD: CPT | Performed by: ORTHOPAEDIC SURGERY

## 2025-05-20 PROCEDURE — 3044F HG A1C LEVEL LT 7.0%: CPT | Performed by: ORTHOPAEDIC SURGERY

## 2025-05-20 PROCEDURE — 1036F TOBACCO NON-USER: CPT | Performed by: ORTHOPAEDIC SURGERY

## 2025-05-20 NOTE — PROGRESS NOTES
Chief complaint: 7/2 weeks out from an L2-3 L3-4 laminectomy    HPI: Patient says he feels amazing.  He wishes he did this years ago.  He is amazed at how upright he can walk.  He has no leg symptoms.  No significant back pain.  He has no headaches blurred vision dizziness or any CSF symptoms.  There was a small durotomy that was repaired.  He is had persistent drainage from his wound postoperatively that been intermittent.  Car has been managing this.  This the first time seeing him in the postoperative period.  He says that he thinks it has stopped draining because the dressing for the past day or 2 actually have not had much on them at all if anything.    On exam I do not have any redness warmth swelling puffiness or dehiscence of the wounds.  There is what appears to be a healed poke hole in the most distal portion of his wound but there is nothing actively draining out of it today.  I cannot express anything..  No signs of infection.  There is no fluid wave.    Assessment/plan: Patient doing excellent after an L2-L4 midline laminectomy.  There was a durotomy.  He was having some drainage.  Unclear to me what this fluid was but at this point he does not appear to be draining much at all if at all at this point.  We are going to let him continue with intermittent dressing changes as needed.  I do not think there is any need for antibiotics as I do not see any active drainage.  Will see him back in 2 weeks and see how he is doing at that point.

## 2025-06-06 ENCOUNTER — OFFICE VISIT (OUTPATIENT)
Dept: ORTHOPEDIC SURGERY | Facility: CLINIC | Age: 70
End: 2025-06-06
Payer: MEDICARE

## 2025-06-06 DIAGNOSIS — M54.10 BACK PAIN WITH RADICULOPATHY: Primary | ICD-10-CM

## 2025-06-06 PROCEDURE — 99212 OFFICE O/P EST SF 10 MIN: CPT | Performed by: ORTHOPAEDIC SURGERY

## 2025-06-06 NOTE — PROGRESS NOTES
Chief complaint: 3-1/2 months out from an L2-4 midline laminectomy    HPI: Patient is here for follow-up at the 3 and half month trinh.  He was having some mild drainage.  I saw him 2 weeks ago and it was not draining at that point.  He says the drainage completely stopped and he feels great.  His legs are almost 100% better compared before surgery.  He says he wishes he did the surgery 15 years ago.  His back still achy and sore but all in all he is dramatically improved.  No fevers chills nausea vomiting.  No bowel or bladder changes.    On exam his incision is perfectly well-healed.  There is no redness warmth swelling puffiness or drainage.  There is no evidence of any seroma or fluid collection.  He has appropriate back range of motion.  He walks normally has good strength in his legs.    Assessment/plan: Patient doing very well after an L2-4 midline laminectomy with a durotomy.  We will let him engage in activities as tolerated he can follow-up with us on a as needed basis.

## 2025-07-11 ENCOUNTER — APPOINTMENT (OUTPATIENT)
Dept: CARDIOLOGY | Facility: CLINIC | Age: 70
End: 2025-07-11
Payer: MEDICARE

## 2025-07-25 ENCOUNTER — APPOINTMENT (OUTPATIENT)
Dept: CARDIOLOGY | Facility: CLINIC | Age: 70
End: 2025-07-25
Payer: MEDICARE

## 2025-07-25 VITALS
DIASTOLIC BLOOD PRESSURE: 60 MMHG | BODY MASS INDEX: 34.49 KG/M2 | HEART RATE: 65 BPM | SYSTOLIC BLOOD PRESSURE: 120 MMHG | WEIGHT: 314 LBS

## 2025-07-25 DIAGNOSIS — G47.33 OSA (OBSTRUCTIVE SLEEP APNEA): ICD-10-CM

## 2025-07-25 DIAGNOSIS — E78.5 HYPERLIPIDEMIA LDL GOAL <100: ICD-10-CM

## 2025-07-25 DIAGNOSIS — I11.9 HYPERTENSIVE HEART DISEASE WITHOUT HEART FAILURE: ICD-10-CM

## 2025-07-25 DIAGNOSIS — E11.29 TYPE 2 DIABETES MELLITUS WITH OTHER DIABETIC KIDNEY COMPLICATION: ICD-10-CM

## 2025-07-25 DIAGNOSIS — Z90.5 HISTORY OF NEPHRECTOMY, LEFT: ICD-10-CM

## 2025-07-25 DIAGNOSIS — Z85.528 H/O RENAL CELL CANCER: ICD-10-CM

## 2025-07-25 DIAGNOSIS — E78.6 LOW HDL (UNDER 40): ICD-10-CM

## 2025-07-25 PROCEDURE — 3078F DIAST BP <80 MM HG: CPT | Performed by: INTERNAL MEDICINE

## 2025-07-25 PROCEDURE — 3074F SYST BP LT 130 MM HG: CPT | Performed by: INTERNAL MEDICINE

## 2025-07-25 PROCEDURE — 99214 OFFICE O/P EST MOD 30 MIN: CPT | Performed by: INTERNAL MEDICINE

## 2025-07-25 PROCEDURE — 1036F TOBACCO NON-USER: CPT | Performed by: INTERNAL MEDICINE

## 2025-07-25 PROCEDURE — 4010F ACE/ARB THERAPY RXD/TAKEN: CPT | Performed by: INTERNAL MEDICINE

## 2025-07-25 PROCEDURE — 1159F MED LIST DOCD IN RCRD: CPT | Performed by: INTERNAL MEDICINE

## 2025-07-25 NOTE — PROGRESS NOTES
CARDIOLOGY OFFICE NOTE     Date:   7/25/2025    Patient:    Brando Barrett    YOB: 1955    Primary Physician: Kristen Araiza MD         REASON FOR VISIT / CHIEF COMPLAINT:     Cardiology follow-up visit    HPI:     Brando Barrett was seen in cardiac evaluation at the [facility] office July 25, 2025.      The patients problems are listed as in the impression below.    Electronic medical records reviewed.    Patient returns.  Feels well.  He is asymptomatic overall.  He had a laminectomy in March 2025 and is improving quite well.  No significant symptomatology post procedure.  No cardiac issues.    Patient denies Chest Pain, SOB, Lightheadedness, Dizziness, TIA or CVA symptoms.  No CHF or Edema.  No Palpitations.  No GI,  or Bleeding Issues. No Recent Fever or Chills.     Cardiovascular and general review of systems is otherwise negative.    A 14-system review is otherwise negative, other than noted.     PHYSICAL EXAMINATION:      Vitals:    07/25/25 1128   BP: 120/60   Pulse: 65     General: No acute distress. Alert and oriented.  Head And Neck Examination: No jugular venous distention, no carotid bruits, no mass. Carotid upstrokes preserved. Oral mucosa moist. No xanthelasma. Head and neck examination otherwise unremarkable.  Lungs: Clear to auscultation and percussion. No wheezes, no rales, and no rhonchi.  Chest: Excursion appeared to be normal. No chest wall tenderness on palpation.  Heart: Normal S1 and S2. No S3. No S4. No rub. Grade 1/6 systolic murmur, best heard at the left sternal border. Point of maximal impulse was within normal limits.  Abdomen: Soft. Nontender. No organomegaly. No bruits. No masses. Obese.  Extremities: No bipedal edema. No clubbing. No cyanosis. Pulses are strong throughout. No bruits.  Musculoskeletal Exam: No ulcers, otherwise unremarkable.  Neuro: Neurologically appeared grossly intact.  .  IMPRESSION:       Cardiovascular status stable    Asymptomatic  Bradycardia  Abnormal rest electrocardiogram  Sick sinus syndrome, abnormal 7-day event monitor  PAF, prior history of atrial fibrillation  PSVT  PVCs frequent  Normal LV function ejection fraction 65 to 70%, echocardiogram 7/2023.  Left ventricular hypertrophy, mild  No valvular heart disease history  Negative Lexiscan perfusion study for MI or ischemia. LVEF 56%. 7/2023.  Hypertension  Hyperlipidemia  Diabetes  Abnormal rest electrocardiogram  Incomplete right bundle branch block  Poor wave enter progression question prior anterior septal MI  Obstructive sleep apnea on CPAP in the past, posterior septal nasal surgery  Renal cell carcinoma status post left nephrectomy 1991  Prior hernia or surgery  Elevated liver function studies  Renal insufficiency  Degenerative joint disease  Lumbar spinal stenosis post laminectomy 3/2025  Obesity  Otherwise as per assessment below.    RECOMMENDATIONS:      Patient overall is doing well.  He will continue his current medications.  Refills were provided.    Encouraged him to follow an exercise dietary program.      He is on the GLP-1 weight reduction injections.  We have encouraged him to hydrate.    [employer] portal use was encouraged.    We will plan to see back in 6 months with Laboratory Studies and ECG as ordered.     Patient will follow up with their primary physician for general care.    The patient knows to contact medical care earlier if need be.      ALLERGIES:     Bactrim [sulfamethoxazole-trimethoprim], Codeine, Magnesium oxide, Procaine, and Morphine     MEDICATIONS:     Current Outpatient Medications   Medication Instructions    allopurinol (ZYLOPRIM) 300 mg, Daily RT    fluticasone (Flonase) 50 mcg/actuation nasal spray 1 spray, 2 times daily PRN    lisinopril 5 mg, Daily    Ozempic 0.5 mg, Once Weekly    rosuvastatin (CRESTOR) 40 mg, Daily RT       ELECTROCARDIOGRAM:      None this visit    CARDIAC TESTING:      None this visit    LABORATORY DATA:       CBC:   Lab Results   Component Value Date    WBC 7.4 03/29/2025    RBC 4.18 (L) 03/29/2025    HGB 12.5 (L) 03/29/2025    HCT 37.6 (L) 03/29/2025     (L) 03/29/2025        CMP:    Lab Results   Component Value Date     (L) 03/29/2025    K 4.5 03/29/2025     03/29/2025    CO2 26 03/29/2025    BUN 26 (H) 03/29/2025    CREATININE 1.49 (H) 03/29/2025    GLUCOSE 178 (H) 03/29/2025    CALCIUM 9.0 03/29/2025       Magnesium:    Lab Results   Component Value Date    MG 1.78 01/08/2025       Lipid Profile:    Lab Results   Component Value Date    CHOL 136 01/08/2025    TRIG 133 01/08/2025    HDL 31.8 01/08/2025    LDLCALC 78 01/08/2025       Hepatic Function Panel:    Lab Results   Component Value Date    ALKPHOS 56 02/18/2025    ALT 24 02/18/2025    AST 25 02/18/2025    PROT 7.0 02/18/2025    BILITOT 0.6 02/18/2025    BILIDIR 0.2 01/08/2025       TSH:    Lab Results   Component Value Date    TSH 2.87 01/08/2025       HgBA1c:    Lab Results   Component Value Date    HGBA1C 6.5 (H) 01/08/2025                   PROBLEM LIST:     Problem List[1]          Arsh Posey MD, FACC   [facility] /  Cardiology      Of Note:  [employer] voice recognition dictation software was utilized partially in the preparation of this note, therefore, inaccuracies in spelling, word choice and punctuation may have occurred which were not recognized at the time of signing.    Patient was seen and examined with total time of visit including chart preparation, rooming, and chart completion exceeding 40 minutes.      Scribe Attestation  By signing my name below, I, Zehra Chamberlain LPN , Scribe attest that this documentation has been prepared under the direction and in the presence of Arsh Posey MD,FACC.    I, Dr. Arsh Posey MD, FACC, personally performed the services described in the documentation as scribed by Zehra Chamberlain LPN  in my presence, and confirm it is both accurate and complete.                [1]    Patient Active Problem List  Diagnosis    Diabetic polyneuropathy associated with type 2 diabetes mellitus    Dysfunction of right eustachian tube    Facial pain    H/O renal cell cancer    History of nephrectomy, left    Hyperlipidemia LDL goal <100    Hypertensive heart disease without heart failure    Hyperuricemia    Low HDL (under 40)    Mixed hearing loss of right ear    Nasal obstruction    RUTH (obstructive sleep apnea)    Pulsatile tinnitus    Type 2 diabetes mellitus with other diabetic kidney complication    Pre-operative clearance    Spinal stenosis, lumbar region without neurogenic claudication    Back pain of lumbar region with sciatica

## 2025-07-25 NOTE — PATIENT INSTRUCTIONS
Follow up  Fasting labs to be done approximately 1 week prior to next appointment. We are a paperless office.  The order is in the computer and you can go to any  lab to have done.   [facility] strongly recommends you make an appointment ahead of time online ([email or web address]/[email or web address])  or via the [employer] [facility] before visiting a [facility] patient service center. You may also call [phone]. You can always ask for order to be printed if you'd like to go to outside lab.      DID YOU KNOW  We have a pharmacy here in the [facility].  They can fill all prescriptions, not just cardiac medications.  Prescriptions from other pharmacies can easily be transferred to the  pharmacy by the  pharmacist on site.   pharmacies offer FREE HOME DELIVERY on medications to anywhere in [place]. They can sync your medications. Typically prescriptions can be ready in 10 - 15 minutes. If pharmacy is unable to fill your  prescription or if cost is more than your paying now the Pharmacist can easily transfer back to your Pharmacy of choice. Pharmacy phone # [phone].     Please bring all medicines, vitamins, and herbal supplements with you in original bottles to every appointment!!!!    Prescriptions will not be filled unless you are compliant with your follow up appointments or have a follow up appointment scheduled as per instruction of your physician. Refills should be requested at the time of your visit.

## 2026-01-23 ENCOUNTER — APPOINTMENT (OUTPATIENT)
Dept: CARDIOLOGY | Facility: CLINIC | Age: 71
End: 2026-01-23
Payer: MEDICARE

## (undated) DEVICE — CAUTERY, PENCIL, PUSH BUTTON, SMOKE EVAC, 70MM

## (undated) DEVICE — DRESSING, GAUZE, DRAIN SPONGE, 6 PLY, EXCILON, 4 X 4 IN, STERILE

## (undated) DEVICE — DRAPE, SHEET, THREE QUARTER, FAN FOLD, 57 X 77 IN

## (undated) DEVICE — TUBING SET, HIGH PRESSURE, 3M, DISP

## (undated) DEVICE — ELECTRODE, ELECTROSURGICAL, BLADE, INSULATED, ENT/IMA, STERILE

## (undated) DEVICE — TOWEL PACK, STERILE, 16X24, XRAY DETECTABLE, BLUE, 4/PK

## (undated) DEVICE — Device

## (undated) DEVICE — GLOVE, SURGICAL, PROTEXIS PI BLUE W/NEUTHERA, 8.0, PF, LF

## (undated) DEVICE — ADHESIVE, SKIN, MASTISOL, 2/3 CC VIAL

## (undated) DEVICE — TIP, SUCTION, YANKAUER, FLEXIBLE

## (undated) DEVICE — STRAP, VELCRO, BODY, 4 X 60IN, NS

## (undated) DEVICE — SEALANT, DURASEAL EXACT, 5ML

## (undated) DEVICE — EVACUATOR, WOUND, SUCTION, CLOSED, JACKSON-PRATT, 100 CC, SILICONE

## (undated) DEVICE — GLOVE, SURGICAL, PROTEXIS PI , 7.5, PF, LF

## (undated) DEVICE — SPONGE, DISSECTOR, PEANUT, 3/8, STERILE 5 FOAM HOLDER"

## (undated) DEVICE — TUBE, FRAZIER SUCTION, 12 FR.

## (undated) DEVICE — DRESSING, MEPILEX BORDER, POST-OP AG, 4 X 12 IN

## (undated) DEVICE — STRAP, ARM BOARD, 32 X 1.5

## (undated) DEVICE — STRIP, SKIN CLOSURE, STERI STRIP, REINFORCED, 0.5 X 4 IN

## (undated) DEVICE — DRESSING, NON-ADHERENT, TELFA, OUCHLESS, 3 X 8 IN, STERILE

## (undated) DEVICE — DRAPE, INCISE, ANTIMICROBIAL, IOBAN 2, STERI DRAPE, 23 X 33 IN, DISPOSABLE, STERILE

## (undated) DEVICE — GOWN, SURGICAL, ROYAL SILK, LG, STERILE

## (undated) DEVICE — SUTURE, STRATAFIX, 1 SYMMETRIC, PDS PLUS, 60CM, CTX, VIOLET

## (undated) DEVICE — IRRIGATION SYSTEM, WOUND, SURGIPHOR, 450ML, STERILE

## (undated) DEVICE — DRESSING, GAUZE, SPONGE, 12 PLY, 4 X 4 IN, PLASTIC POUCH, STRL 10PK

## (undated) DEVICE — SUTURE, VICRYL PLUS, 2-0, 8X18IN, CP-2, CR, UND, BRAIDED

## (undated) DEVICE — FLOSEAL, MATRIX, HEMOSTATIC, FULL STERILE PREP, 5ML

## (undated) DEVICE — SUTURE, MONOCRYL, 3-0, PS-1 27IN, UNDYED

## (undated) DEVICE — MANIFOLD, 4 PORT NEPTUNE STANDARD

## (undated) DEVICE — CATHETER, IV, ANGIOCATH, 14 G X 5.25 IN, FEP POLYMER

## (undated) DEVICE — DRAPE, INSTRUMENT, W/POUCH, STERI DRAPE, 7 X 11 IN, DISPOSABLE, STERILE

## (undated) DEVICE — SPONGE, NEURO, 3/4 X 3/4IN, STERILE, LF

## (undated) DEVICE — APPLICATOR, CHLORAPREP, W/ORANGE TINT, 26ML

## (undated) DEVICE — DRAPE, SHEET, 17 X 23 IN

## (undated) DEVICE — SUCKER, SARNS MINI, W/ 1/4 CONNECTOR

## (undated) DEVICE — NEEDLE, SPINAL, QUINCKE, 18 G X 3.5 IN, PINK HUB

## (undated) DEVICE — DRAPE, SMARTDRAPE, FOR TIVATO MICROSCOPE

## (undated) DEVICE — BLADE, BLUNT, 25MM, WITH TUBESET

## (undated) DEVICE — STAPLER, SKIN, PLUS, WIDE, 35

## (undated) DEVICE — DRAPE, SHEET, XL

## (undated) DEVICE — WAX, BONE, 2.5 GM

## (undated) DEVICE — PKIT, PROAXIS PRONEVIEW, NO ACP TUBING

## (undated) DEVICE — ELECTRODE, ELECTROSURGICAL, BLADE EXT 4 INCH, INSULATED

## (undated) DEVICE — TRAY, MINOR, SINGLE BASIN, STERILE

## (undated) DEVICE — SOLUTION, IRRIGATION, SODIUM CHLORIDE 0.9%, 1000 ML, POUR BOTTLE

## (undated) DEVICE — BUR, ROUND 5MM OSTEON, ELITE, SOFT TOUCH

## (undated) DEVICE — TRAY, SURESTEP, SILICONE DRAINAGE BAG, STATLOCK, 16FR

## (undated) DEVICE — GOWN, SURGICAL, ROYAL SILK, XL, STERILE

## (undated) DEVICE — SEALER, BIPOLAR, AQUA MANTYS 6.0

## (undated) DEVICE — GOWN, SURGICAL, ROYAL SILK, XXL, STERILE

## (undated) DEVICE — DRAIN, WOUND, ROUND, W/TROCAR, HOLE PATTERN, 10 IN, MEDIUM, 1/8 X 49 IN